# Patient Record
Sex: FEMALE | Race: WHITE | NOT HISPANIC OR LATINO | Employment: FULL TIME | URBAN - NONMETROPOLITAN AREA
[De-identification: names, ages, dates, MRNs, and addresses within clinical notes are randomized per-mention and may not be internally consistent; named-entity substitution may affect disease eponyms.]

---

## 2023-03-14 ENCOUNTER — OFFICE VISIT (OUTPATIENT)
Dept: PRIMARY CARE | Facility: CLINIC | Age: 58
End: 2023-03-14
Payer: COMMERCIAL

## 2023-03-14 VITALS
HEART RATE: 87 BPM | SYSTOLIC BLOOD PRESSURE: 138 MMHG | DIASTOLIC BLOOD PRESSURE: 87 MMHG | WEIGHT: 214 LBS | BODY MASS INDEX: 34.39 KG/M2 | HEIGHT: 66 IN

## 2023-03-14 DIAGNOSIS — R21 RASH DUE TO ALLERGY: Primary | ICD-10-CM

## 2023-03-14 DIAGNOSIS — T78.40XA RASH DUE TO ALLERGY: Primary | ICD-10-CM

## 2023-03-14 PROCEDURE — 3075F SYST BP GE 130 - 139MM HG: CPT | Performed by: INTERNAL MEDICINE

## 2023-03-14 PROCEDURE — 1036F TOBACCO NON-USER: CPT | Performed by: INTERNAL MEDICINE

## 2023-03-14 PROCEDURE — 3079F DIAST BP 80-89 MM HG: CPT | Performed by: INTERNAL MEDICINE

## 2023-03-14 PROCEDURE — 99213 OFFICE O/P EST LOW 20 MIN: CPT | Performed by: INTERNAL MEDICINE

## 2023-03-14 RX ORDER — LYSINE HCL 500 MG
1 TABLET ORAL DAILY
COMMUNITY

## 2023-03-14 RX ORDER — PREDNISONE 10 MG/1
10 TABLET ORAL 3 TIMES DAILY
Qty: 15 TABLET | Refills: 0 | Status: SHIPPED | OUTPATIENT
Start: 2023-03-14 | End: 2023-03-19

## 2023-03-14 RX ORDER — TIRZEPATIDE 10 MG/.5ML
10 INJECTION, SOLUTION SUBCUTANEOUS
COMMUNITY
End: 2023-11-20 | Stop reason: ALTCHOICE

## 2023-03-14 RX ORDER — LISINOPRIL 10 MG/1
10 TABLET ORAL DAILY
COMMUNITY
End: 2023-11-20 | Stop reason: SDUPTHER

## 2023-03-14 ASSESSMENT — ENCOUNTER SYMPTOMS
NECK STIFFNESS: 0
CONSTITUTIONAL NEGATIVE: 1
CONSTIPATION: 0
ABDOMINAL PAIN: 0
SHORTNESS OF BREATH: 0
ENDOCRINE NEGATIVE: 1
NAUSEA: 0
CONFUSION: 0
HEMATOLOGIC/LYMPHATIC NEGATIVE: 1
ABDOMINAL DISTENTION: 0
NEUROLOGICAL NEGATIVE: 1
PSYCHIATRIC NEGATIVE: 1
HEADACHES: 0
PALPITATIONS: 0
RESPIRATORY NEGATIVE: 1
BACK PAIN: 0
CHILLS: 0
DIARRHEA: 0
MUSCULOSKELETAL NEGATIVE: 1
GASTROINTESTINAL NEGATIVE: 1
NUMBNESS: 0
LIGHT-HEADEDNESS: 0
COUGH: 0
ADENOPATHY: 0
EYE DISCHARGE: 0
JOINT SWELLING: 0
DYSURIA: 0
EYES NEGATIVE: 1
FEVER: 0
AGITATION: 0
ALLERGIC/IMMUNOLOGIC NEGATIVE: 1
WHEEZING: 0
VOMITING: 0
CARDIOVASCULAR NEGATIVE: 1

## 2023-03-14 ASSESSMENT — PATIENT HEALTH QUESTIONNAIRE - PHQ9
SUM OF ALL RESPONSES TO PHQ9 QUESTIONS 1 AND 2: 0
2. FEELING DOWN, DEPRESSED OR HOPELESS: NOT AT ALL
1. LITTLE INTEREST OR PLEASURE IN DOING THINGS: NOT AT ALL

## 2023-03-14 NOTE — PROGRESS NOTES
Subjective   Patient ID: Maureen Giles is a 57 y.o. female who presents for Follow-up (PT CO RASH ON FACE AND NECK THAT IS VERY ITCHY STARTED SUNDAY).  PT C/O RASH ON FACE  FOR 3 DAYS  SEVERITY: MILD  CHARACTERISTIC: ACUTE  EXACERBATION FACTOR: NONE  RELIEVING FACTOR: NONE  ASSOCIATED SYMPTOMS: ITCH  PRIOR TX: BENADRYL ADVIL HYDROCIORTISONE            Review of Systems   Constitutional: Negative.  Negative for chills and fever.   HENT: Negative.  Negative for congestion.    Eyes: Negative.  Negative for discharge.   Respiratory: Negative.  Negative for cough, shortness of breath and wheezing.    Cardiovascular: Negative.  Negative for chest pain, palpitations and leg swelling.   Gastrointestinal: Negative.  Negative for abdominal distention, abdominal pain, constipation, diarrhea, nausea and vomiting.   Endocrine: Negative.    Genitourinary: Negative.  Negative for dysuria and urgency.   Musculoskeletal: Negative.  Negative for back pain, joint swelling and neck stiffness.   Skin:  Positive for rash.   Allergic/Immunologic: Negative.  Negative for immunocompromised state.   Neurological: Negative.  Negative for light-headedness, numbness and headaches.   Hematological: Negative.  Negative for adenopathy.   Psychiatric/Behavioral: Negative.  Negative for agitation, behavioral problems and confusion.    All other systems reviewed and are negative.      Objective   Physical Exam  Vitals reviewed.   Constitutional:       General: She is not in acute distress.     Appearance: Normal appearance.   HENT:      Head: Normocephalic and atraumatic.      Nose: Nose normal.   Eyes:      Conjunctiva/sclera: Conjunctivae normal.      Pupils: Pupils are equal, round, and reactive to light.   Neck:      Vascular: No carotid bruit.   Cardiovascular:      Rate and Rhythm: Normal rate and regular rhythm.      Pulses: Normal pulses.      Heart sounds:      No gallop.   Pulmonary:      Effort: Pulmonary effort is normal. No  "respiratory distress.      Breath sounds: Normal breath sounds. No wheezing.   Abdominal:      General: Bowel sounds are normal.      Palpations: Abdomen is soft.      Tenderness: There is no abdominal tenderness.   Musculoskeletal:         General: Normal range of motion.      Cervical back: Normal range of motion. No rigidity.   Lymphadenopathy:      Cervical: No cervical adenopathy.   Skin:     General: Skin is warm.      Findings: No rash.      Comments: ERYTHEMATOUS ITCH ON BOTH CHEEKS AND NOSE   Neurological:      General: No focal deficit present.      Mental Status: She is alert and oriented to person, place, and time.   Psychiatric:         Mood and Affect: Mood normal.         Behavior: Behavior normal.       /87 (BP Location: Left arm, Patient Position: Sitting)   Pulse 87   Ht 1.676 m (5' 6\")   Wt 97.1 kg (214 lb)   BMI 34.54 kg/m²    Hemoglobin A1C   Date/Time Value Ref Range Status   11/14/2022 08:06 AM 5.6 % Final     Comment:          Diagnosis of Diabetes-Adults   Non-Diabetic: < or = 5.6%   Increased risk for developing diabetes: 5.7-6.4%   Diagnostic of diabetes: > or = 6.5%  .       Monitoring of Diabetes                Age (y)     Therapeutic Goal (%)   Adults:          >18           <7.0   Pediatrics:    13-18           <7.5                   7-12           <8.0                   0- 6            7.5-8.5   American Diabetes Association. Diabetes Care 33(S1), Jan 2010.       Assessment/Plan   Problem List Items Addressed This Visit    None  Visit Diagnoses       Rash due to allergy    -  Primary    Relevant Medications    dexAMETHasone (Decadron) injection 4 mg (Start on 3/14/2023  4:00 PM)    predniSONE (Deltasone) 10 mg tablet           "

## 2023-03-17 PROBLEM — R73.02 GLUCOSE INTOLERANCE (IMPAIRED GLUCOSE TOLERANCE): Status: ACTIVE | Noted: 2023-03-17

## 2023-03-17 PROBLEM — M79.671 RIGHT FOOT PAIN: Status: ACTIVE | Noted: 2023-03-17

## 2023-03-17 PROBLEM — M47.816 LUMBAR ARTHROPATHY: Status: ACTIVE | Noted: 2023-03-17

## 2023-03-17 PROBLEM — G89.29 CHRONIC LOW BACK PAIN WITH RIGHT-SIDED SCIATICA: Status: ACTIVE | Noted: 2023-03-17

## 2023-03-17 PROBLEM — S53.402A SPRAIN OF LEFT UPPER ARM: Status: ACTIVE | Noted: 2023-03-17

## 2023-03-17 PROBLEM — M46.1 SACROILIITIS (CMS-HCC): Status: ACTIVE | Noted: 2023-03-17

## 2023-03-17 PROBLEM — J30.9 ALLERGIC RHINITIS: Status: ACTIVE | Noted: 2023-03-17

## 2023-03-17 PROBLEM — E78.00 HYPERCHOLESTEREMIA: Status: ACTIVE | Noted: 2023-03-17

## 2023-03-17 PROBLEM — M51.379 DISC DISEASE, DEGENERATIVE, LUMBAR OR LUMBOSACRAL: Status: ACTIVE | Noted: 2023-03-17

## 2023-03-17 PROBLEM — M54.41 CHRONIC LOW BACK PAIN WITH RIGHT-SIDED SCIATICA: Status: ACTIVE | Noted: 2023-03-17

## 2023-03-17 PROBLEM — K21.9 GERD (GASTROESOPHAGEAL REFLUX DISEASE): Status: ACTIVE | Noted: 2023-03-17

## 2023-03-17 PROBLEM — E66.01 CLASS 2 SEVERE OBESITY DUE TO EXCESS CALORIES WITH SERIOUS COMORBIDITY AND BODY MASS INDEX (BMI) OF 35.0 TO 35.9 IN ADULT (MULTI): Status: ACTIVE | Noted: 2023-03-17

## 2023-03-17 PROBLEM — E66.812 CLASS 2 SEVERE OBESITY DUE TO EXCESS CALORIES WITH SERIOUS COMORBIDITY AND BODY MASS INDEX (BMI) OF 35.0 TO 35.9 IN ADULT: Status: ACTIVE | Noted: 2023-03-17

## 2023-03-17 PROBLEM — R63.5 WEIGHT GAIN: Status: ACTIVE | Noted: 2023-03-17

## 2023-03-17 PROBLEM — G47.00 INSOMNIA: Status: ACTIVE | Noted: 2023-03-17

## 2023-03-17 PROBLEM — M51.37 DISC DISEASE, DEGENERATIVE, LUMBAR OR LUMBOSACRAL: Status: ACTIVE | Noted: 2023-03-17

## 2023-03-17 PROBLEM — M54.16 LUMBAR NEURITIS: Status: ACTIVE | Noted: 2023-03-17

## 2023-03-17 PROBLEM — I10 BENIGN ESSENTIAL HYPERTENSION: Status: ACTIVE | Noted: 2023-03-17

## 2023-03-17 PROBLEM — G56.03 BILATERAL CARPAL TUNNEL SYNDROME: Status: ACTIVE | Noted: 2023-03-17

## 2023-03-17 RX ORDER — MELOXICAM 15 MG/1
15 TABLET ORAL DAILY
COMMUNITY
End: 2023-11-20 | Stop reason: ALTCHOICE

## 2023-03-21 ENCOUNTER — APPOINTMENT (OUTPATIENT)
Dept: PRIMARY CARE | Facility: CLINIC | Age: 58
End: 2023-03-21
Payer: COMMERCIAL

## 2023-11-10 ENCOUNTER — LAB (OUTPATIENT)
Dept: LAB | Facility: LAB | Age: 58
End: 2023-11-10
Payer: COMMERCIAL

## 2023-11-10 DIAGNOSIS — Z00.00 ENCOUNTER FOR GENERAL ADULT MEDICAL EXAMINATION WITHOUT ABNORMAL FINDINGS: Primary | ICD-10-CM

## 2023-11-10 DIAGNOSIS — R73.02 IMPAIRED GLUCOSE TOLERANCE (ORAL): ICD-10-CM

## 2023-11-10 LAB
ALBUMIN SERPL BCP-MCNC: 4 G/DL (ref 3.4–5)
ALP SERPL-CCNC: 79 U/L (ref 33–110)
ALT SERPL W P-5'-P-CCNC: 21 U/L (ref 7–45)
ANION GAP SERPL CALC-SCNC: 10 MMOL/L (ref 10–20)
AST SERPL W P-5'-P-CCNC: 16 U/L (ref 9–39)
BASOPHILS # BLD AUTO: 0.05 X10*3/UL (ref 0–0.1)
BASOPHILS NFR BLD AUTO: 0.8 %
BILIRUB SERPL-MCNC: 0.4 MG/DL (ref 0–1.2)
BUN SERPL-MCNC: 16 MG/DL (ref 6–23)
CALCIUM SERPL-MCNC: 9.2 MG/DL (ref 8.6–10.3)
CHLORIDE SERPL-SCNC: 106 MMOL/L (ref 98–107)
CHOLEST SERPL-MCNC: 183 MG/DL (ref 0–199)
CHOLESTEROL/HDL RATIO: 3.7
CO2 SERPL-SCNC: 29 MMOL/L (ref 21–32)
CREAT SERPL-MCNC: 0.67 MG/DL (ref 0.5–1.05)
EOSINOPHIL # BLD AUTO: 0.13 X10*3/UL (ref 0–0.7)
EOSINOPHIL NFR BLD AUTO: 2 %
ERYTHROCYTE [DISTWIDTH] IN BLOOD BY AUTOMATED COUNT: 13 % (ref 11.5–14.5)
EST. AVERAGE GLUCOSE BLD GHB EST-MCNC: 123 MG/DL
GFR SERPL CREATININE-BSD FRML MDRD: >90 ML/MIN/1.73M*2
GLUCOSE SERPL-MCNC: 95 MG/DL (ref 74–99)
HBA1C MFR BLD: 5.9 %
HCT VFR BLD AUTO: 42.9 % (ref 36–46)
HDLC SERPL-MCNC: 50 MG/DL
HGB BLD-MCNC: 14.1 G/DL (ref 12–16)
IMM GRANULOCYTES # BLD AUTO: 0.04 X10*3/UL (ref 0–0.7)
IMM GRANULOCYTES NFR BLD AUTO: 0.6 % (ref 0–0.9)
LDLC SERPL CALC-MCNC: 111 MG/DL
LYMPHOCYTES # BLD AUTO: 1.48 X10*3/UL (ref 1.2–4.8)
LYMPHOCYTES NFR BLD AUTO: 22.6 %
MCH RBC QN AUTO: 29.1 PG (ref 26–34)
MCHC RBC AUTO-ENTMCNC: 32.9 G/DL (ref 32–36)
MCV RBC AUTO: 89 FL (ref 80–100)
MONOCYTES # BLD AUTO: 0.53 X10*3/UL (ref 0.1–1)
MONOCYTES NFR BLD AUTO: 8.1 %
NEUTROPHILS # BLD AUTO: 4.33 X10*3/UL (ref 1.2–7.7)
NEUTROPHILS NFR BLD AUTO: 65.9 %
NON HDL CHOLESTEROL: 133 MG/DL (ref 0–149)
NRBC BLD-RTO: 0 /100 WBCS (ref 0–0)
PLATELET # BLD AUTO: 269 X10*3/UL (ref 150–450)
POTASSIUM SERPL-SCNC: 4.8 MMOL/L (ref 3.5–5.3)
PROT SERPL-MCNC: 6.5 G/DL (ref 6.4–8.2)
RBC # BLD AUTO: 4.85 X10*6/UL (ref 4–5.2)
SODIUM SERPL-SCNC: 140 MMOL/L (ref 136–145)
TRIGL SERPL-MCNC: 108 MG/DL (ref 0–149)
TSH SERPL-ACNC: 2.15 MIU/L (ref 0.44–3.98)
VLDL: 22 MG/DL (ref 0–40)
WBC # BLD AUTO: 6.6 X10*3/UL (ref 4.4–11.3)

## 2023-11-10 PROCEDURE — 80061 LIPID PANEL: CPT

## 2023-11-10 PROCEDURE — 36415 COLL VENOUS BLD VENIPUNCTURE: CPT

## 2023-11-10 PROCEDURE — 80053 COMPREHEN METABOLIC PANEL: CPT

## 2023-11-10 PROCEDURE — 84443 ASSAY THYROID STIM HORMONE: CPT

## 2023-11-10 PROCEDURE — 83036 HEMOGLOBIN GLYCOSYLATED A1C: CPT

## 2023-11-10 PROCEDURE — 85025 COMPLETE CBC W/AUTO DIFF WBC: CPT

## 2023-11-20 ENCOUNTER — OFFICE VISIT (OUTPATIENT)
Dept: PRIMARY CARE | Facility: CLINIC | Age: 58
End: 2023-11-20
Payer: COMMERCIAL

## 2023-11-20 VITALS
BODY MASS INDEX: 33.91 KG/M2 | HEIGHT: 66 IN | DIASTOLIC BLOOD PRESSURE: 82 MMHG | WEIGHT: 211 LBS | SYSTOLIC BLOOD PRESSURE: 123 MMHG | HEART RATE: 83 BPM

## 2023-11-20 DIAGNOSIS — Z78.0 POST-MENOPAUSAL: ICD-10-CM

## 2023-11-20 DIAGNOSIS — M79.644 PAIN IN FINGER OF BOTH HANDS: ICD-10-CM

## 2023-11-20 DIAGNOSIS — Z12.31 ENCOUNTER FOR SCREENING MAMMOGRAM FOR BREAST CANCER: ICD-10-CM

## 2023-11-20 DIAGNOSIS — M79.671 RIGHT FOOT PAIN: ICD-10-CM

## 2023-11-20 DIAGNOSIS — I10 BENIGN ESSENTIAL HYPERTENSION: Primary | ICD-10-CM

## 2023-11-20 DIAGNOSIS — E66.09 CLASS 1 OBESITY DUE TO EXCESS CALORIES WITH SERIOUS COMORBIDITY AND BODY MASS INDEX (BMI) OF 34.0 TO 34.9 IN ADULT: ICD-10-CM

## 2023-11-20 DIAGNOSIS — M79.645 PAIN IN FINGER OF BOTH HANDS: ICD-10-CM

## 2023-11-20 DIAGNOSIS — R73.02 GLUCOSE INTOLERANCE (IMPAIRED GLUCOSE TOLERANCE): ICD-10-CM

## 2023-11-20 DIAGNOSIS — M79.622 AXILLARY PAIN, LEFT: ICD-10-CM

## 2023-11-20 PROBLEM — E66.811 CLASS 1 OBESITY DUE TO EXCESS CALORIES WITH SERIOUS COMORBIDITY AND BODY MASS INDEX (BMI) OF 34.0 TO 34.9 IN ADULT: Status: ACTIVE | Noted: 2023-03-17

## 2023-11-20 PROCEDURE — 99214 OFFICE O/P EST MOD 30 MIN: CPT | Performed by: PHYSICIAN ASSISTANT

## 2023-11-20 PROCEDURE — 1036F TOBACCO NON-USER: CPT | Performed by: PHYSICIAN ASSISTANT

## 2023-11-20 PROCEDURE — 3008F BODY MASS INDEX DOCD: CPT | Performed by: PHYSICIAN ASSISTANT

## 2023-11-20 PROCEDURE — 3074F SYST BP LT 130 MM HG: CPT | Performed by: PHYSICIAN ASSISTANT

## 2023-11-20 PROCEDURE — 3079F DIAST BP 80-89 MM HG: CPT | Performed by: PHYSICIAN ASSISTANT

## 2023-11-20 RX ORDER — LISINOPRIL 10 MG/1
10 TABLET ORAL DAILY
Qty: 90 TABLET | Refills: 3 | Status: SHIPPED | OUTPATIENT
Start: 2023-11-20

## 2023-11-20 RX ORDER — MULTIVITAMIN
1 TABLET ORAL
COMMUNITY

## 2023-11-20 ASSESSMENT — ENCOUNTER SYMPTOMS
FEVER: 0
EYE DISCHARGE: 0
RHINORRHEA: 0
ARTHRALGIAS: 1
SHORTNESS OF BREATH: 0
NUMBNESS: 0
EYE REDNESS: 0
COUGH: 0
VOMITING: 0
CHEST TIGHTNESS: 0
FREQUENCY: 0
ABDOMINAL PAIN: 0
MYALGIAS: 1
WHEEZING: 0
HEADACHES: 0
CHILLS: 0
FATIGUE: 1
BACK PAIN: 0
TREMORS: 0
BRUISES/BLEEDS EASILY: 0
CONSTIPATION: 0
WOUND: 0
DIZZINESS: 0
NECK PAIN: 0
SINUS PAIN: 0
FLANK PAIN: 0
PALPITATIONS: 0
DIARRHEA: 0
SORE THROAT: 0
NAUSEA: 0
CONFUSION: 0
SLEEP DISTURBANCE: 0

## 2023-11-20 NOTE — PROGRESS NOTES
Subjective   Patient ID: Maureen Giles is a 58 y.o. female who presents for Follow-up    HPI  Wellness - too early so will reschedule for later this week      Labs     med check   HTN - lisinopril   Pain - flexeril and mobic prn   wt loss - she is following with a clinic and is on mounjouro has since been off med but done well maintaining wt loss but questions if she can re-start.  We discussed wegovey or saxenda or she can reach out to the clinic again   foot pain - plantar fascitis/heel spur - cont to suffer at times. using NSAID only prn and denies following with pod in a while- she notes occasional cramp /spasm into the L calf.  We discussed consider related to dehydration vs the foot issues - will monitor     L axilla fullness and discomfort - some limitation in movement  Denies any known injury   Possibly started around the same time as her husbands cardiac work up and he is now wearing angel hose - she admits it is a struggle for her put on the angel hose  She denies feeling a lympnode  or breast mass   She is due for mammo so this will be done   She does notes some finger pain as well that is flared up since putting on the angel hose - comes and goes - no known injury     Preventative testing   mammo - Dec 2021- WNl- agree to do every other year   DEXa- Dec 2019 - WNL  pap - 2014 -- pt declines at this time  COLONOSCOPY - Cologuard dec 2021- NEG   Fall -NEG nov 2023   PHQ2 NEG NOV 2023     - Other Providers  Eye - follows annually - [facility]  Dentist - every 6 mo   Pod - Dr Martin - has been quite sometime     - Vaccines   Flu - declines   PNA - suggest age 65  COVID - Moderna Jan and FEb 2021 and Booster in Dec 2021   Shingles- suggest age 50   Dtap - over 5 years   MMR -   Patient Active Problem List   Diagnosis    Allergic rhinitis    Benign essential hypertension    Bilateral carpal tunnel syndrome    Chronic low back pain with right-sided sciatica    GERD (gastroesophageal reflux disease)    Glucose  intolerance (impaired glucose tolerance)    Hypercholesteremia    Insomnia    Lumbar arthropathy    Lumbar neuritis    Disc disease, degenerative, lumbar or lumbosacral    Right foot pain    Sacroiliitis (CMS/HCC)    Sprain of left upper arm    Weight gain    Class 2 severe obesity due to excess calories with serious comorbidity and body mass index (BMI) of 35.0 to 35.9 in adult (CMS/[facility])       Review of Systems   Constitutional:  Positive for fatigue. Negative for chills and fever.   HENT:  Negative for congestion, rhinorrhea, sinus pain, sore throat and tinnitus.    Eyes:  Negative for discharge, redness and visual disturbance.   Respiratory:  Negative for cough, chest tightness, shortness of breath and wheezing.    Cardiovascular:  Negative for chest pain, palpitations and leg swelling.   Gastrointestinal:  Negative for abdominal pain, constipation, diarrhea, nausea and vomiting.   Endocrine: Negative for cold intolerance and heat intolerance.   Genitourinary:  Negative for flank pain, frequency and urgency.   Musculoskeletal:  Positive for arthralgias and myalgias. Negative for back pain, gait problem and neck pain.   Skin:  Negative for rash and wound.   Neurological:  Negative for dizziness, tremors, syncope, numbness and headaches.   Hematological:  Does not bruise/bleed easily.   Psychiatric/Behavioral:  Negative for confusion, sleep disturbance and suicidal ideas.        Past Medical History:   Diagnosis Date    Encounter for gynecological examination (general) (routine) without abnormal findings     Pap test, as part of routine gynecological examination    Encounter for screening for malignant neoplasm of colon 12/08/2021    COLOGUARD NEG    Immunization not carried out because of patient refusal     Influenza vaccination declined    Personal history of other diseases of the musculoskeletal system and connective tissue     History of low back pain    Personal history of other diseases of the  "musculoskeletal system and connective tissue     History of muscle spasm    Personal history of other medical treatment     H/O bone density study    Personal history of other medical treatment     H/O mammogram    Personal history of other specified conditions     History of palpitations       Past Surgical History:   Procedure Laterality Date     SECTION, LOW TRANSVERSE      OTHER SURGICAL HISTORY  2021    Epidural steroid injection    OTHER SURGICAL HISTORY  2021    Facet block       Family History   Problem Relation Name Age of Onset    Heart disease Mother      Arthritis Mother      Diabetes type II Mother      Hearing loss Father      Heart disease Father      Other (heart problem) Father      Diabetes type II Sister      Parkinsonism Brother         Social History     Tobacco Use    Smoking status: Never    Smokeless tobacco: Never   Vaping Use    Vaping Use: Never used   Substance Use Topics    Alcohol use: Yes     Comment: RARE OCCASION    Drug use: Never       No Known Allergies    Current Outpatient Medications   Medication Sig Dispense Refill    calcium carbonate-vit D3-min 600 mg calcium- 400 unit tablet Take 1 tablet by mouth once daily.      lisinopril 10 mg tablet Take 1 tablet (10 mg) by mouth once daily.      multivitamin tablet Take 1 tablet by mouth once daily.       No current facility-administered medications for this visit.       Objective   /82   Pulse 83   Ht 1.676 m (5' 6\")   Wt 95.7 kg (211 lb)   BMI 34.06 kg/m²     Physical Exam  Vitals reviewed.   Constitutional:       Appearance: Normal appearance. She is obese.   HENT:      Head: Normocephalic.      Right Ear: External ear normal.      Left Ear: External ear normal.      Nose: Nose normal. No congestion or rhinorrhea.      Mouth/Throat:      Mouth: Mucous membranes are moist.   Eyes:      Extraocular Movements: Extraocular movements intact.      Conjunctiva/sclera: Conjunctivae normal.      Pupils: Pupils " are equal, round, and reactive to light.   Cardiovascular:      Rate and Rhythm: Normal rate and regular rhythm.      Pulses: Normal pulses.   Pulmonary:      Effort: Pulmonary effort is normal.      Breath sounds: Normal breath sounds.   Abdominal:      General: Bowel sounds are normal.      Palpations: Abdomen is soft.      Tenderness: There is no abdominal tenderness. There is no right CVA tenderness or left CVA tenderness.   Musculoskeletal:         General: Tenderness and deformity present. Normal range of motion.      Cervical back: Normal range of motion and neck supple. No tenderness.   Skin:     General: Skin is warm and dry.      Comments: L axillary region - no palp lymphnodes   Given her presentation I suspect this is more of muscle strain from the activity of putting on her husbands angel hose    Neurological:      General: No focal deficit present.      Mental Status: She is alert and oriented to person, place, and time.   Psychiatric:         Mood and Affect: Mood normal.         Behavior: Behavior normal.       Testing   Component      Latest Ref [facility] 11/10/2023   WBC      4.4 - 11.3 x10*3/uL 6.6    nRBC      0.0 - 0.0 /100 WBCs 0.0    RBC      4.00 - 5.20 x10*6/uL 4.85    HEMOGLOBIN      12.0 - 16.0 g/dL 14.1    HEMATOCRIT      36.0 - 46.0 % 42.9    MCV      80 - 100 fL 89    MCH      26.0 - 34.0 pg 29.1    MCHC      32.0 - 36.0 g/dL 32.9    RED CELL DISTRIBUTION WIDTH      11.5 - 14.5 % 13.0    Platelets      150 - 450 x10*3/uL 269    Neutrophils %      40.0 - 80.0 % 65.9    Immature Granulocytes %, Automated      0.0 - 0.9 % 0.6    Lymphocytes %      13.0 - 44.0 % 22.6    Monocytes %      2.0 - 10.0 % 8.1    Eosinophils %      0.0 - 6.0 % 2.0    Basophils %      0.0 - 2.0 % 0.8    Neutrophils Absolute      1.20 - 7.70 x10*3/uL 4.33    Immature Granulocytes Absolute, Automated      0.00 - 0.70 x10*3/uL 0.04    Lymphocytes Absolute      1.20 - 4.80 x10*3/uL 1.48    Monocytes Absolute      0.10 - 1.00  x10*3/uL 0.53    Eosinophils Absolute      0.00 - 0.70 x10*3/uL 0.13    Basophils Absolute      0.00 - 0.10 x10*3/uL 0.05    GLUCOSE      74 - 99 mg/dL 95    SODIUM      136 - 145 mmol/L 140    POTASSIUM      3.5 - 5.3 mmol/L 4.8    CHLORIDE      98 - 107 mmol/L 106    Bicarbonate      21 - 32 mmol/L 29    Anion Gap      10 - 20 mmol/L 10    Blood Urea Nitrogen      6 - 23 mg/dL 16    Creatinine      0.50 - 1.05 mg/dL 0.67    EGFR      >60 mL/min/1.73m*2 >90    Calcium      8.6 - 10.3 mg/dL 9.2    Albumin      3.4 - 5.0 g/dL 4.0    Alkaline Phosphatase      33 - 110 U/L 79    Total Protein      6.4 - 8.2 g/dL 6.5    AST      9 - 39 U/L 16    Bilirubin Total      0.0 - 1.2 mg/dL 0.4    ALT      7 - 45 U/L 21    CHOLESTEROL      0 - 199 mg/dL 183    HDL CHOLESTEROL      mg/dL 50.0    Cholesterol/HDL Ratio 3.7    LDL Calculated      <=99 mg/dL 111 (H)    VLDL      0 - 40 mg/dL 22    TRIGLYCERIDES      0 - 149 mg/dL 108    Non HDL Cholesterol      0 - 149 mg/dL 133    Hemoglobin A1C      see below % 5.9 (H)    Estimated Average Glucose      Not Established mg/dL 123    Thyroid Stimulating Hormone      0.44 - 3.98 mIU/L 2.15       Impression    MDM    1) COMPLEXITY: MORE THAN 1 STABLE CHRONIC CONDITION ADDRESSED  2)DATA: TESTS INTERPRETED AND OR ORDERED, TOOK INDEPENDENT HISTORY OR RECORDS REVIEWED  3)RISK: MODERATE RISK DUE TO NATURE OF MEDICAL CONDITIONS/COMORBIDITY OR MEDICATIONS ORDERED OR SURGICAL OR PROCEDURE REFERRAL, .       Reviewed labs and Testing on file   Patient to follow diet low in cholesterol, fat, and sodium.    Patient is advised to increase Exercise.  Patient is recommended to lose weight.  Reviewed Meds and discussed common side effects  Continue as directed   Axillary discomfort- suspect more of strain with her change in ADLS - will monitor and get updated mammo   Calf spasm - discussed dehydration vs triggered from the pod issues.  Cont to follow with pod - inc water intake - will check mag next  set of labs   Hand /finger pain - suspect OA - will monitor   Will plan to order labs for 1 year from now at her wellness visit later this week   Patient is strongly advised to be compliant with recommendations.    Return to Clinic sooner if needed.  Patient denies further questions/concerns at this time     Assessment/Plan   Problem List Items Addressed This Visit             ICD-10-CM    Benign essential hypertension - Primary I10    Relevant Medications    lisinopril 10 mg tablet    Glucose intolerance (impaired glucose tolerance) R73.02    Right foot pain M79.671    Class 1 obesity due to excess calories with serious comorbidity and body mass index (BMI) of 34.0 to 34.9 in adult E66.09, Z68.34     Other Visit Diagnoses         Codes    Post-menopausal     Z78.0    Relevant Orders    XR DEXA bone density    Encounter for screening mammogram for breast cancer     Z12.31    Relevant Orders    BI mammo bilateral screening tomosynthesis    Axillary pain, left     M79.622    Pain in finger of both hands     M79.645, M79.644             FU wed double book 8 AM - wellness with labs

## 2023-11-22 ENCOUNTER — OFFICE VISIT (OUTPATIENT)
Dept: PRIMARY CARE | Facility: CLINIC | Age: 58
End: 2023-11-22
Payer: COMMERCIAL

## 2023-11-22 VITALS
BODY MASS INDEX: 34.07 KG/M2 | HEIGHT: 66 IN | OXYGEN SATURATION: 92 % | DIASTOLIC BLOOD PRESSURE: 83 MMHG | HEART RATE: 74 BPM | WEIGHT: 212 LBS | SYSTOLIC BLOOD PRESSURE: 121 MMHG

## 2023-11-22 DIAGNOSIS — E66.09 CLASS 1 OBESITY DUE TO EXCESS CALORIES WITH SERIOUS COMORBIDITY AND BODY MASS INDEX (BMI) OF 34.0 TO 34.9 IN ADULT: ICD-10-CM

## 2023-11-22 DIAGNOSIS — I10 BENIGN ESSENTIAL HYPERTENSION: ICD-10-CM

## 2023-11-22 DIAGNOSIS — R73.02 GLUCOSE INTOLERANCE (IMPAIRED GLUCOSE TOLERANCE): ICD-10-CM

## 2023-11-22 DIAGNOSIS — Z00.00 ENCOUNTER FOR WELLNESS EXAMINATION IN ADULT: Primary | ICD-10-CM

## 2023-11-22 DIAGNOSIS — E78.00 HYPERCHOLESTEREMIA: ICD-10-CM

## 2023-11-22 PROCEDURE — 3074F SYST BP LT 130 MM HG: CPT | Performed by: PHYSICIAN ASSISTANT

## 2023-11-22 PROCEDURE — 3079F DIAST BP 80-89 MM HG: CPT | Performed by: PHYSICIAN ASSISTANT

## 2023-11-22 PROCEDURE — 99396 PREV VISIT EST AGE 40-64: CPT | Performed by: PHYSICIAN ASSISTANT

## 2023-11-22 PROCEDURE — 1036F TOBACCO NON-USER: CPT | Performed by: PHYSICIAN ASSISTANT

## 2023-11-22 PROCEDURE — 3008F BODY MASS INDEX DOCD: CPT | Performed by: PHYSICIAN ASSISTANT

## 2023-11-22 ASSESSMENT — ENCOUNTER SYMPTOMS
NUMBNESS: 0
FATIGUE: 0
COUGH: 0
SORE THROAT: 0
FREQUENCY: 0
PALPITATIONS: 0
DIZZINESS: 0
SINUS PAIN: 0
EYE REDNESS: 0
SLEEP DISTURBANCE: 0
HEADACHES: 0
EYE DISCHARGE: 0
NECK PAIN: 0
BACK PAIN: 0
TREMORS: 0
SHORTNESS OF BREATH: 0
DIARRHEA: 0
VOMITING: 0
NAUSEA: 0
WHEEZING: 0
BRUISES/BLEEDS EASILY: 0
FLANK PAIN: 0
FEVER: 0
CHILLS: 0
CONSTIPATION: 0
CONFUSION: 0
CHEST TIGHTNESS: 0
RHINORRHEA: 0
WOUND: 0
ABDOMINAL PAIN: 0

## 2023-11-22 NOTE — PROGRESS NOTES
Subjective   Patient ID: Maureen Giles is a 58 y.o. female who presents for WELLNESS VISIT (WELLNESS VISIT)    HPI  Wellness       Labs      med check   HTN - lisinopril   Pain - flexeril and mobic prn   wt loss - she is following with a clinic and is on mounjouro has since been off med but done well maintaining wt loss but questions if she can re-start.  We discussed wegovey or saxenda - not covered or she can reach out to the clinic again   foot pain - plantar fascitis/heel spur - cont to suffer at times. using NSAID only prn and denies following with pod in a while- she notes occasional cramp /spasm into the L calf.  We discussed consider related to dehydration vs the foot issues - will monitor      Preventative testing   mammo - Dec 2021- WNl-scheduled for next week   DEXa- Dec 2019 - WNL- scheduled next week   pap - 2014 -- pt declines at this time  COLONOSCOPY - Cologuard dec 2021- NEG   Fall -NEG nov 2023   PHQ2 NEG NOV 2023      - Other Providers  Eye - follows annually - [facility]  Dentist - every 6 mo   Pod - Dr Martin - fall 2023      - Vaccines   Flu - declines   PNA - suggest age 65  COVID - Moderna Jan and FEb 2021 and Booster in Dec 2021   Shingles- suggest age 50 - suggest shingrix   Dtap - over 6 years - suggest booster  MMR -       Patient Active Problem List   Diagnosis    Allergic rhinitis    Benign essential hypertension    Bilateral carpal tunnel syndrome    Chronic low back pain with right-sided sciatica    GERD (gastroesophageal reflux disease)    Glucose intolerance (impaired glucose tolerance)    Hypercholesteremia    Insomnia    Lumbar arthropathy    Lumbar neuritis    Disc disease, degenerative, lumbar or lumbosacral    Right foot pain    Sacroiliitis (CMS/HCC)    Sprain of left upper arm    Weight gain    Class 1 obesity due to excess calories with serious comorbidity and body mass index (BMI) of 34.0 to 34.9 in adult       Review of Systems   Constitutional:  Negative for  chills, fatigue and fever.   HENT:  Negative for congestion, rhinorrhea, sinus pain, sore throat and tinnitus.    Eyes:  Negative for discharge, redness and visual disturbance.   Respiratory:  Negative for cough, chest tightness, shortness of breath and wheezing.    Cardiovascular:  Negative for chest pain, palpitations and leg swelling.   Gastrointestinal:  Negative for abdominal pain, constipation, diarrhea, nausea and vomiting.   Endocrine: Negative for cold intolerance and heat intolerance.   Genitourinary:  Negative for flank pain, frequency and urgency.   Musculoskeletal:  Negative for back pain, gait problem and neck pain.   Skin:  Negative for rash and wound.   Neurological:  Negative for dizziness, tremors, syncope, numbness and headaches.   Hematological:  Does not bruise/bleed easily.   Psychiatric/Behavioral:  Negative for confusion, sleep disturbance and suicidal ideas.        Past Medical History:   Diagnosis Date    Encounter for gynecological examination (general) (routine) without abnormal findings     Pap test, as part of routine gynecological examination    Encounter for screening for malignant neoplasm of colon 2021    COLOGUARD NEG    Immunization not carried out because of patient refusal     Influenza vaccination declined    Personal history of other diseases of the musculoskeletal system and connective tissue     History of low back pain    Personal history of other diseases of the musculoskeletal system and connective tissue     History of muscle spasm    Personal history of other medical treatment     H/O bone density study    Personal history of other medical treatment     H/O mammogram    Personal history of other specified conditions     History of palpitations       Past Surgical History:   Procedure Laterality Date     SECTION, LOW TRANSVERSE      OTHER SURGICAL HISTORY  2021    Epidural steroid injection    OTHER SURGICAL HISTORY  2021    Facet block  "      Family History   Problem Relation Name Age of Onset    Heart disease Mother      Arthritis Mother      Diabetes type II Mother      Hearing loss Father      Heart disease Father      Other (heart problem) Father      Diabetes type II Sister      Parkinsonism Brother         Social History     Tobacco Use    Smoking status: Never    Smokeless tobacco: Never   Vaping Use    Vaping Use: Never used   Substance Use Topics    Alcohol use: Yes     Comment: RARE OCCASION    Drug use: Never       No Known Allergies    Current Outpatient Medications   Medication Sig Dispense Refill    calcium carbonate-vit D3-min 600 mg calcium- 400 unit tablet Take 1 tablet by mouth once daily.      lisinopril 10 mg tablet Take 1 tablet (10 mg) by mouth once daily. 90 tablet 3    multivitamin tablet Take 1 tablet by mouth once daily.       No current facility-administered medications for this visit.       Objective   /83   Pulse 74   Ht 1.676 m (5' 6\")   Wt 96.2 kg (212 lb)   SpO2 92%   BMI 34.22 kg/m²     Physical Exam  Vitals reviewed.   Constitutional:       Appearance: Normal appearance. She is obese.   HENT:      Head: Normocephalic.      Right Ear: External ear normal.      Left Ear: External ear normal.      Nose: Nose normal. No congestion or rhinorrhea.      Mouth/Throat:      Mouth: Mucous membranes are moist.   Eyes:      Extraocular Movements: Extraocular movements intact.      Conjunctiva/sclera: Conjunctivae normal.      Pupils: Pupils are equal, round, and reactive to light.   Cardiovascular:      Rate and Rhythm: Normal rate and regular rhythm.      Pulses: Normal pulses.   Pulmonary:      Effort: Pulmonary effort is normal.      Breath sounds: Normal breath sounds.   Abdominal:      General: Bowel sounds are normal.      Palpations: Abdomen is soft.      Tenderness: There is no abdominal tenderness. There is no right CVA tenderness or left CVA tenderness.   Musculoskeletal:         General: No tenderness. " Normal range of motion.      Cervical back: Normal range of motion and neck supple. No tenderness.   Skin:     General: Skin is warm and dry.   Neurological:      General: No focal deficit present.      Mental Status: She is alert and oriented to person, place, and time.   Psychiatric:         Mood and Affect: Mood normal.         Behavior: Behavior normal.       Testing     Component      Latest Ref Rng 11/10/2023   WBC      4.4 - 11.3 x10*3/uL 6.6    nRBC      0.0 - 0.0 /100 WBCs 0.0    RBC      4.00 - 5.20 x10*6/uL 4.85    HEMOGLOBIN      12.0 - 16.0 g/dL 14.1    HEMATOCRIT      36.0 - 46.0 % 42.9    MCV      80 - 100 fL 89    MCH      26.0 - 34.0 pg 29.1    MCHC      32.0 - 36.0 g/dL 32.9    RED CELL DISTRIBUTION WIDTH      11.5 - 14.5 % 13.0    Platelets      150 - 450 x10*3/uL 269    Neutrophils %      40.0 - 80.0 % 65.9    Immature Granulocytes %, Automated      0.0 - 0.9 % 0.6    Lymphocytes %      13.0 - 44.0 % 22.6    Monocytes %      2.0 - 10.0 % 8.1    Eosinophils %      0.0 - 6.0 % 2.0    Basophils %      0.0 - 2.0 % 0.8    Neutrophils Absolute      1.20 - 7.70 x10*3/uL 4.33    Immature Granulocytes Absolute, Automated      0.00 - 0.70 x10*3/uL 0.04    Lymphocytes Absolute      1.20 - 4.80 x10*3/uL 1.48    Monocytes Absolute      0.10 - 1.00 x10*3/uL 0.53    Eosinophils Absolute      0.00 - 0.70 x10*3/uL 0.13    Basophils Absolute      0.00 - 0.10 x10*3/uL 0.05    GLUCOSE      74 - 99 mg/dL 95    SODIUM      136 - 145 mmol/L 140    POTASSIUM      3.5 - 5.3 mmol/L 4.8    CHLORIDE      98 - 107 mmol/L 106    Bicarbonate      21 - 32 mmol/L 29    Anion Gap      10 - 20 mmol/L 10    Blood Urea Nitrogen      6 - 23 mg/dL 16    Creatinine      0.50 - 1.05 mg/dL 0.67    EGFR      >60 mL/min/1.73m*2 >90    Calcium      8.6 - 10.3 mg/dL 9.2    Albumin      3.4 - 5.0 g/dL 4.0    Alkaline Phosphatase      33 - 110 U/L 79    Total Protein      6.4 - 8.2 g/dL 6.5    AST      9 - 39 U/L 16    Bilirubin Total      0.0  - 1.2 mg/dL 0.4    ALT      7 - 45 U/L 21    CHOLESTEROL      0 - 199 mg/dL 183    HDL CHOLESTEROL      mg/dL 50.0    Cholesterol/HDL Ratio 3.7    LDL Calculated      <=99 mg/dL 111 (H)    VLDL      0 - 40 mg/dL 22    TRIGLYCERIDES      0 - 149 mg/dL 108    Non HDL Cholesterol      0 - 149 mg/dL 133    Hemoglobin A1C      see below % 5.9 (H)    Estimated Average Glucose      Not Established mg/dL 123    Thyroid Stimulating Hormone      0.44 - 3.98 mIU/L 2.15         Impression    MDM    1) COMPLEXITY: MORE THAN 1 STABLE CHRONIC CONDITION ADDRESSED  2)DATA: TESTS INTERPRETED AND OR ORDERED, TOOK INDEPENDENT HISTORY OR RECORDS REVIEWED  3)RISK: MODERATE RISK DUE TO NATURE OF MEDICAL CONDITIONS/COMORBIDITY OR MEDICATIONS ORDERED OR SURGICAL OR PROCEDURE REFERRAL, .       Reviewed labs and Testing on file   Patient to follow diet low in cholesterol, fat, and sodium.    Patient is advised to increase Exercise.  Patient is recommended to lose weight.  Reviewed Meds and discussed common side effects  Continue as directed   Patient is strongly advised to be compliant with recommendations.    Return to Clinic sooner if needed.  Patient denies further questions/concerns at this time     - discussed wellness - if her insurance will cover and does not need to be 12 mo +1 day to move sooner in the year - 6 mo.  She is to call so we can modify lab orders     Assessment/Plan   Problem List Items Addressed This Visit             ICD-10-CM    Benign essential hypertension I10    Glucose intolerance (impaired glucose tolerance) R73.02    Hypercholesteremia E78.00    Class 1 obesity due to excess calories with serious comorbidity and body mass index (BMI) of 34.0 to 34.9 in adult E66.09, Z68.34     Other Visit Diagnoses         Codes    Encounter for wellness examination in adult    -  Primary Z00.00    Relevant Orders    CBC and Auto Differential    Comprehensive Metabolic Panel    Hemoglobin A1C    Lipid Panel    Thyroid Stimulating  Hormone    Magnesium    Vitamin B12             FU in 12 mo +1 day with wellness and labs at [facility]

## 2023-11-30 ENCOUNTER — APPOINTMENT (OUTPATIENT)
Dept: RADIOLOGY | Facility: CLINIC | Age: 58
End: 2023-11-30
Payer: COMMERCIAL

## 2023-12-07 ENCOUNTER — ANCILLARY PROCEDURE (OUTPATIENT)
Dept: RADIOLOGY | Facility: CLINIC | Age: 58
End: 2023-12-07
Payer: COMMERCIAL

## 2023-12-07 DIAGNOSIS — Z12.31 ENCOUNTER FOR SCREENING MAMMOGRAM FOR BREAST CANCER: ICD-10-CM

## 2023-12-07 DIAGNOSIS — Z78.0 POST-MENOPAUSAL: ICD-10-CM

## 2023-12-07 PROCEDURE — 77063 BREAST TOMOSYNTHESIS BI: CPT

## 2023-12-07 PROCEDURE — 77080 DXA BONE DENSITY AXIAL: CPT

## 2023-12-07 PROCEDURE — 77080 DXA BONE DENSITY AXIAL: CPT | Performed by: RADIOLOGY

## 2023-12-07 PROCEDURE — 77063 BREAST TOMOSYNTHESIS BI: CPT | Performed by: RADIOLOGY

## 2023-12-07 PROCEDURE — 77067 SCR MAMMO BI INCL CAD: CPT | Performed by: RADIOLOGY

## 2024-07-16 ENCOUNTER — TELEMEDICINE (OUTPATIENT)
Dept: PRIMARY CARE | Facility: CLINIC | Age: 59
End: 2024-07-16
Payer: COMMERCIAL

## 2024-07-16 DIAGNOSIS — J04.0 LARYNGITIS: ICD-10-CM

## 2024-07-16 DIAGNOSIS — E66.09 CLASS 1 OBESITY DUE TO EXCESS CALORIES WITH SERIOUS COMORBIDITY AND BODY MASS INDEX (BMI) OF 34.0 TO 34.9 IN ADULT: ICD-10-CM

## 2024-07-16 DIAGNOSIS — J01.20 ACUTE NON-RECURRENT ETHMOIDAL SINUSITIS: Primary | ICD-10-CM

## 2024-07-16 DIAGNOSIS — R73.02 GLUCOSE INTOLERANCE (IMPAIRED GLUCOSE TOLERANCE): ICD-10-CM

## 2024-07-16 DIAGNOSIS — R06.00 PND (PAROXYSMAL NOCTURNAL DYSPNEA): ICD-10-CM

## 2024-07-16 PROCEDURE — 99213 OFFICE O/P EST LOW 20 MIN: CPT | Performed by: PHYSICIAN ASSISTANT

## 2024-07-16 PROCEDURE — 1036F TOBACCO NON-USER: CPT | Performed by: PHYSICIAN ASSISTANT

## 2024-07-16 PROCEDURE — 3008F BODY MASS INDEX DOCD: CPT | Performed by: PHYSICIAN ASSISTANT

## 2024-07-16 RX ORDER — AZITHROMYCIN 250 MG/1
TABLET, FILM COATED ORAL
Qty: 6 TABLET | Refills: 0 | Status: SHIPPED | OUTPATIENT
Start: 2024-07-16

## 2024-07-16 RX ORDER — METHYLPREDNISOLONE 4 MG/1
TABLET ORAL
Qty: 21 TABLET | Refills: 0 | Status: SHIPPED | OUTPATIENT
Start: 2024-07-16 | End: 2024-07-23

## 2024-07-16 ASSESSMENT — ENCOUNTER SYMPTOMS
WHEEZING: 0
CHILLS: 0
VOICE CHANGE: 1
FREQUENCY: 0
DIARRHEA: 0
TREMORS: 0
BRUISES/BLEEDS EASILY: 0
EYE REDNESS: 0
CONFUSION: 0
DIZZINESS: 0
ABDOMINAL PAIN: 0
RHINORRHEA: 0
FATIGUE: 1
NAUSEA: 0
NECK PAIN: 0
SINUS PRESSURE: 1
PALPITATIONS: 0
SORE THROAT: 1
FLANK PAIN: 0
EYE DISCHARGE: 0
HEADACHES: 0
NUMBNESS: 0
CHEST TIGHTNESS: 0
SHORTNESS OF BREATH: 0
SINUS PAIN: 0
WOUND: 0
SLEEP DISTURBANCE: 0
VOMITING: 0
CONSTIPATION: 0
COUGH: 1
BACK PAIN: 0
FEVER: 0

## 2024-07-16 NOTE — PROGRESS NOTES
An interactive audio and video telecommunication system which permits real time communication between the patient (at home) and the provider (at the office) was utilized to provide this telehealth service     Virtual or Telephone Consent    Verbal consent was requested and obtained from Maureen Giles on this date, 07/16/24 for a telehealth visit.     Subjective   Patient ID: Maureen Giles is a 59 y.o. female who presents for Illness (SINUS DRAINAGE, COUGH, SORE THROAT X4 DAYS)    HPI    Illness symptoms x 4 days and no improvement   Cough (dry but sounds productive)  that's progressed into laryngitis   Min sinus pressure  Symptoms feel more in her throat not in her chest yet   Denies covid testing   Taking mucinex and robitussin   No fever     med check   HTN - lisinopril   Pain - flexeril and mobic prn   wt loss - she is following with a clinic and is on mounjouro has since been off med but done well maintaining wt loss but questions if she can re-start.  We discussed wegovey or saxenda - not covered or she can reach out to the clinic again   foot pain - plantar fascitis/heel spur - cont to suffer at times. using NSAID only prn and denies following with pod in a while- she notes occasional cramp /spasm into the L calf.  We discussed consider related to dehydration vs the foot issues - will monitor      Preventative testing   mammo - Dec 2021- WNl-scheduled for next week   DEXa- Dec 2019 - WNL- scheduled next week   pap - 2014 -- pt declines at this time  COLONOSCOPY - Cologuard dec 2021- NEG   Fall -NEG nov 2023   PHQ2 NEG NOV 2023        Patient Active Problem List   Diagnosis    Allergic rhinitis    Benign essential hypertension    Bilateral carpal tunnel syndrome    Chronic low back pain with right-sided sciatica    GERD (gastroesophageal reflux disease)    Glucose intolerance (impaired glucose tolerance)    Hypercholesteremia    Insomnia    Lumbar arthropathy    Lumbar neuritis    Disc disease,  degenerative, lumbar or lumbosacral    Right foot pain    Sacroiliitis (CMS-HCC)    Sprain of left upper arm    Weight gain    Class 1 obesity due to excess calories with serious comorbidity and body mass index (BMI) of 34.0 to 34.9 in adult       Review of Systems   Constitutional:  Positive for fatigue. Negative for chills and fever.   HENT:  Positive for congestion, postnasal drip, sinus pressure, sore throat and voice change. Negative for rhinorrhea, sinus pain and tinnitus.    Eyes:  Negative for discharge, redness and visual disturbance.   Respiratory:  Positive for cough. Negative for chest tightness, shortness of breath and wheezing.    Cardiovascular:  Negative for chest pain, palpitations and leg swelling.   Gastrointestinal:  Negative for abdominal pain, constipation, diarrhea, nausea and vomiting.   Endocrine: Negative for cold intolerance and heat intolerance.   Genitourinary:  Negative for flank pain, frequency and urgency.   Musculoskeletal:  Negative for back pain, gait problem and neck pain.   Skin:  Negative for rash and wound.   Neurological:  Negative for dizziness, tremors, syncope, numbness and headaches.   Hematological:  Does not bruise/bleed easily.   Psychiatric/Behavioral:  Negative for confusion, sleep disturbance and suicidal ideas.        Past Medical History:   Diagnosis Date    Encounter for gynecological examination (general) (routine) without abnormal findings     Pap test, as part of routine gynecological examination    Encounter for screening for malignant neoplasm of colon 12/08/2021    COLOGUARD NEG    Immunization not carried out because of patient refusal     Influenza vaccination declined    Personal history of other diseases of the musculoskeletal system and connective tissue     History of low back pain    Personal history of other diseases of the musculoskeletal system and connective tissue     History of muscle spasm    Personal history of other medical treatment     H/O  bone density study    Personal history of other medical treatment     H/O mammogram    Personal history of other specified conditions     History of palpitations       Past Surgical History:   Procedure Laterality Date     SECTION, LOW TRANSVERSE      OTHER SURGICAL HISTORY  2021    Epidural steroid injection    OTHER SURGICAL HISTORY  2021    Facet block       Family History   Problem Relation Name Age of Onset    Heart disease Mother      Arthritis Mother      Diabetes type II Mother      Hearing loss Father      Heart disease Father      Other (heart problem) Father      Diabetes type II Sister      Parkinsonism Brother         Social History     Tobacco Use    Smoking status: Never    Smokeless tobacco: Never   Vaping Use    Vaping status: Never Used   Substance Use Topics    Alcohol use: Yes     Comment: RARE OCCASION    Drug use: Never       No Known Allergies    Current Outpatient Medications   Medication Sig Dispense Refill    calcium carbonate-vit D3-min 600 mg calcium- 400 unit tablet Take 1 tablet by mouth once daily.      lisinopril 10 mg tablet Take 1 tablet (10 mg) by mouth once daily. 90 tablet 3    multivitamin tablet Take 1 tablet by mouth once daily.       No current facility-administered medications for this visit.       Objective   There were no vitals taken for this visit.    Physical Exam  Vitals reviewed.   Constitutional:       Appearance: Normal appearance. She is obese.   HENT:      Head: Normocephalic.   Musculoskeletal:         General: Normal range of motion.   Neurological:      General: No focal deficit present.      Mental Status: She is alert and oriented to person, place, and time.   Psychiatric:         Mood and Affect: Mood normal.         Behavior: Behavior normal.       Testing       Impression    MDM      Reviewed labs and Testing on file   Patient to follow diet low in cholesterol, fat, and sodium.    Patient is advised to increase Exercise.  Patient is  recommended to lose weight.  Reviewed Meds and discussed common side effects  Continue as directed   Consider follow up in 1 week - pt to call if needed   Patient is strongly advised to be compliant with recommendations.    Return to Clinic sooner if needed.  Patient denies further questions/concerns at this time     Assessment/Plan   Problem List Items Addressed This Visit             ICD-10-CM    Glucose intolerance (impaired glucose tolerance) R73.02    Class 1 obesity due to excess calories with serious comorbidity and body mass index (BMI) of 34.0 to 34.9 in adult E66.09, Z68.34     Other Visit Diagnoses         Codes    Acute non-recurrent ethmoidal sinusitis    -  Primary J01.20    Relevant Medications    methylPREDNISolone (Medrol Dospak) 4 mg tablets    azithromycin (Zithromax) 250 mg tablet    Laryngitis     J04.0    Relevant Medications    methylPREDNISolone (Medrol Dospak) 4 mg tablets    PND (paroxysmal nocturnal dyspnea)     R06.00    Relevant Medications    methylPREDNISolone (Medrol Dospak) 4 mg tablets          FU as before    Work slip today and tomorrow

## 2024-11-05 ENCOUNTER — OFFICE VISIT (OUTPATIENT)
Dept: PRIMARY CARE | Facility: CLINIC | Age: 59
End: 2024-11-05
Payer: COMMERCIAL

## 2024-11-05 VITALS
BODY MASS INDEX: 36.64 KG/M2 | HEART RATE: 73 BPM | SYSTOLIC BLOOD PRESSURE: 134 MMHG | DIASTOLIC BLOOD PRESSURE: 82 MMHG | HEIGHT: 66 IN | WEIGHT: 228 LBS

## 2024-11-05 DIAGNOSIS — E66.812 CLASS 2 SEVERE OBESITY DUE TO EXCESS CALORIES WITH SERIOUS COMORBIDITY AND BODY MASS INDEX (BMI) OF 36.0 TO 36.9 IN ADULT: ICD-10-CM

## 2024-11-05 DIAGNOSIS — R73.02 GLUCOSE INTOLERANCE (IMPAIRED GLUCOSE TOLERANCE): ICD-10-CM

## 2024-11-05 DIAGNOSIS — I10 BENIGN ESSENTIAL HYPERTENSION: ICD-10-CM

## 2024-11-05 DIAGNOSIS — T78.40XA ALLERGIC REACTION TO DRUG, INITIAL ENCOUNTER: Primary | ICD-10-CM

## 2024-11-05 DIAGNOSIS — E66.01 CLASS 2 SEVERE OBESITY DUE TO EXCESS CALORIES WITH SERIOUS COMORBIDITY AND BODY MASS INDEX (BMI) OF 36.0 TO 36.9 IN ADULT: ICD-10-CM

## 2024-11-05 PROCEDURE — 3008F BODY MASS INDEX DOCD: CPT | Performed by: PHYSICIAN ASSISTANT

## 2024-11-05 PROCEDURE — 99214 OFFICE O/P EST MOD 30 MIN: CPT | Performed by: PHYSICIAN ASSISTANT

## 2024-11-05 PROCEDURE — 3075F SYST BP GE 130 - 139MM HG: CPT | Performed by: PHYSICIAN ASSISTANT

## 2024-11-05 PROCEDURE — 3079F DIAST BP 80-89 MM HG: CPT | Performed by: PHYSICIAN ASSISTANT

## 2024-11-05 PROCEDURE — 1036F TOBACCO NON-USER: CPT | Performed by: PHYSICIAN ASSISTANT

## 2024-11-05 RX ORDER — METHYLPREDNISOLONE 4 MG/1
TABLET ORAL
Qty: 21 TABLET | Refills: 0 | Status: SHIPPED | OUTPATIENT
Start: 2024-11-05 | End: 2024-11-12

## 2024-11-05 RX ORDER — MELOXICAM 15 MG/1
15 TABLET ORAL DAILY
COMMUNITY
Start: 2024-10-30 | End: 2024-11-05 | Stop reason: SINTOL

## 2024-11-05 ASSESSMENT — ENCOUNTER SYMPTOMS
FLANK PAIN: 0
VOMITING: 0
TREMORS: 0
CHEST TIGHTNESS: 0
SINUS PAIN: 0
NAUSEA: 0
HEADACHES: 0
NUMBNESS: 0
FATIGUE: 0
EYE REDNESS: 0
CONFUSION: 0
BRUISES/BLEEDS EASILY: 0
CHILLS: 0
CONSTIPATION: 0
DIARRHEA: 0
DIZZINESS: 0
FREQUENCY: 0
WOUND: 0
WHEEZING: 0
BACK PAIN: 0
NECK PAIN: 0
EYE DISCHARGE: 0
SORE THROAT: 0
PALPITATIONS: 0
RHINORRHEA: 0
COUGH: 0
FEVER: 0
SLEEP DISTURBANCE: 0
ABDOMINAL PAIN: 0
SHORTNESS OF BREATH: 0

## 2024-11-05 ASSESSMENT — PATIENT HEALTH QUESTIONNAIRE - PHQ9
2. FEELING DOWN, DEPRESSED OR HOPELESS: NOT AT ALL
1. LITTLE INTEREST OR PLEASURE IN DOING THINGS: NOT AT ALL
SUM OF ALL RESPONSES TO PHQ9 QUESTIONS 1 AND 2: 0

## 2024-11-05 NOTE — PROGRESS NOTES
Subjective   Patient ID: Maureen Giles is a 59 y.o. female who presents for Rash (C/O ITCHY FACIAL RASH WITH REDNESS, SWELLING. RECENTLY STARTED MELOXICAM PRESCRIBED BY PODIATRIST FOR PLANTAR FASCIITIS. SHE WAS ALSO OUTSIDE RAKING LEAVES THE OTHER DAY, UNSURE IF EITHER CHANGE IS RELATED. )    Rash  Pertinent negatives include no congestion, cough, diarrhea, fatigue, fever, rhinorrhea, shortness of breath, sore throat or vomiting.       Patient presents today for    1 possible allergic reaction   Pt started mobic for her feet about 5-6 days ago and noted yesterday redness and itchy swelling on face.  She woke up with the symptoms slightly worse. She did take another dose of mobic this morning.  Was to take 1 tab daily x 10 days and is just over half ways through the course of treatment.  She denies other change in soaps, detergents or meds recently.  She denies prior diagnosis of rosacea.  She was outside raking leaves recently but denies itchy rash on other areas of her body. She denies a lump in her throat feeling or difficulty swallowing   Consider development of reaction to lisinopril but less likely given the med changes and hx     2 weight loss   Discussed need for updated labs as set later this month   Discussed trial of adipex with MMT   Discussed injectables - pt to check with insurance and possibly copay cards     med check   HTN - lisinopril   Pain -  wt loss - she is following with a clinic and is on mounjouro has since been off med but done well maintaining wt loss but questions if she can re-start.  We discussed wequin or saxenda - not covered or she can reach out to the clinic again   foot pain - plantar fascitis/heel spur - cont to suffer at times. using NSAID only prn and denies following with pod in a while- she notes occasional cramp /spasm into the L calf.  We discussed consider related to dehydration vs the foot issues - will monitor      Preventative testing   mammo - Dec 2021-  WNl-scheduled for next week   DEXa- Dec 2019 - WNL- scheduled next week   pap - 2014 -- pt declines at this time  COLONOSCOPY - Cologuard dec 2021- NEG   Fall -NEG nov 2023   PHQ2 NEG NOV 2023           Patient Active Problem List   Diagnosis    Allergic rhinitis    Benign essential hypertension    Bilateral carpal tunnel syndrome    Chronic low back pain with right-sided sciatica    GERD (gastroesophageal reflux disease)    Glucose intolerance (impaired glucose tolerance)    Hypercholesteremia    Insomnia    Lumbar arthropathy    Lumbar neuritis    Disc disease, degenerative, lumbar or lumbosacral    Right foot pain    Sacroiliitis (CMS-HCC)    Sprain of left upper arm    Weight gain    Class 1 obesity due to excess calories with serious comorbidity and body mass index (BMI) of 34.0 to 34.9 in adult       Review of Systems   Constitutional:  Negative for chills, fatigue and fever.   HENT:  Negative for congestion, rhinorrhea, sinus pain, sore throat and tinnitus.    Eyes:  Negative for discharge, redness and visual disturbance.   Respiratory:  Negative for cough, chest tightness, shortness of breath and wheezing.    Cardiovascular:  Negative for chest pain, palpitations and leg swelling.   Gastrointestinal:  Negative for abdominal pain, constipation, diarrhea, nausea and vomiting.   Endocrine: Negative for cold intolerance and heat intolerance.   Genitourinary:  Negative for flank pain, frequency and urgency.   Musculoskeletal:  Negative for back pain, gait problem and neck pain.   Skin:  Positive for rash. Negative for wound.   Neurological:  Negative for dizziness, tremors, syncope, numbness and headaches.   Hematological:  Does not bruise/bleed easily.   Psychiatric/Behavioral:  Negative for confusion, sleep disturbance and suicidal ideas.        Past Medical History:   Diagnosis Date    Encounter for gynecological examination (general) (routine) without abnormal findings     Pap test, as part of routine  gynecological examination    Encounter for screening for malignant neoplasm of colon 2021    COLOGUARD NEG    Immunization not carried out because of patient refusal     Influenza vaccination declined    Personal history of other diseases of the musculoskeletal system and connective tissue     History of low back pain    Personal history of other diseases of the musculoskeletal system and connective tissue     History of muscle spasm    Personal history of other medical treatment     H/O bone density study    Personal history of other medical treatment     H/O mammogram    Personal history of other specified conditions     History of palpitations       Past Surgical History:   Procedure Laterality Date     SECTION, LOW TRANSVERSE      OTHER SURGICAL HISTORY  2021    Epidural steroid injection    OTHER SURGICAL HISTORY  2021    Facet block       Family History   Problem Relation Name Age of Onset    Heart disease Mother      Arthritis Mother      Diabetes type II Mother      Hearing loss Father      Heart disease Father      Other (heart problem) Father      Diabetes type II Sister      Parkinsonism Brother         Social History     Tobacco Use    Smoking status: Never    Smokeless tobacco: Never   Vaping Use    Vaping status: Never Used   Substance Use Topics    Alcohol use: Yes     Comment: RARE OCCASION    Drug use: Never       No Known Allergies    Current Outpatient Medications   Medication Sig Dispense Refill    calcium carbonate-vit D3-min 600 mg calcium- 400 unit tablet Take 1 tablet by mouth once daily.      lisinopril 10 mg tablet Take 1 tablet (10 mg) by mouth once daily. 90 tablet 3    meloxicam (Mobic) 15 mg tablet Take 1 tablet (15 mg) by mouth once daily.      multivitamin tablet Take 1 tablet by mouth once daily.      azithromycin (Zithromax) 250 mg tablet Take 2 tabs (500 mg) by mouth today, than 1 daily for 4 days. 6 tablet 0     No current facility-administered  "medications for this visit.       Objective   BP (!) 152/92   Pulse 73   Ht 1.676 m (5' 6\")   Wt 103 kg (228 lb)   BMI 36.80 kg/m²     Physical Exam  Vitals reviewed.   Constitutional:       Appearance: Normal appearance. She is obese.   HENT:      Head: Normocephalic.      Right Ear: External ear normal.      Left Ear: External ear normal.      Nose: Nose normal. No congestion or rhinorrhea.      Mouth/Throat:      Mouth: Mucous membranes are moist.      Pharynx: No posterior oropharyngeal erythema.   Eyes:      Extraocular Movements: Extraocular movements intact.      Conjunctiva/sclera: Conjunctivae normal.      Pupils: Pupils are equal, round, and reactive to light.   Cardiovascular:      Rate and Rhythm: Normal rate and regular rhythm.      Pulses: Normal pulses.   Pulmonary:      Effort: Pulmonary effort is normal.      Breath sounds: Normal breath sounds.   Abdominal:      General: Bowel sounds are normal.      Palpations: Abdomen is soft.      Tenderness: There is no abdominal tenderness. There is no right CVA tenderness or left CVA tenderness.   Musculoskeletal:         General: No tenderness. Normal range of motion.      Cervical back: Normal range of motion and neck supple. No tenderness.   Skin:     General: Skin is warm and dry.      Findings: Erythema and rash present.      Comments: Erythematous rash with small vesicular/papules noted across the forehead and cheeks  Slightly telangectasia noted on the cheeks as well    Neurological:      General: No focal deficit present.      Mental Status: She is alert and oriented to person, place, and time.   Psychiatric:         Mood and Affect: Mood normal.         Behavior: Behavior normal.       Testing   Reviewed labs on file  Reviewed labs ordered to be done in the near future       Impression    MDM    1) COMPLEXITY: 1 UNDIAGNOSED NEW PROBLEM WITH UNCERTAIN PROGNOSIS  2)DATA: TESTS INTERPRETED AND OR ORDERED, TOOK INDEPENDENT HISTORY OR RECORDS " REVIEWED  3)RISK: MODERATE RISK DUE TO NATURE OF MEDICAL CONDITIONS/COMORBIDITY OR MEDICATIONS ORDERED OR SURGICAL OR PROCEDURE REFERRAL, .     Reviewed labs and Testing on file   Patient to follow diet low in cholesterol, fat, and sodium.    Patient is advised to increase Exercise.  Patient is recommended to lose weight.  Reviewed Meds and discussed common side effects  Continue as directed   Suspect allergic reaction to mobic - stop med and discussed zyrtec or benadryl.  Medrol will help resolve symptoms faster.  Unlikely but consider she has developed reaction to lisinopril - monitor closely   Cont with pod   Patient is strongly advised to be compliant with recommendations.    Return to Clinic sooner if needed.  Patient denies further questions/concerns at this time     Assessment/Plan   Problem List Items Addressed This Visit             ICD-10-CM    Benign essential hypertension I10    Glucose intolerance (impaired glucose tolerance) R73.02    Class 2 severe obesity due to excess calories with serious comorbidity and body mass index (BMI) of 36.0 to 36.9 in adult E66.812, E66.01, Z68.36     Other Visit Diagnoses         Codes    Allergic reaction to drug, initial encounter    -  Primary T78.40XA    Relevant Medications    methylPREDNISolone (Medrol Dospak) 4 mg tablets          FU in 1 week - pt to call if needed   FU as before in few weeks with labs and med check

## 2024-11-14 DIAGNOSIS — I10 BENIGN ESSENTIAL HYPERTENSION: ICD-10-CM

## 2024-11-14 RX ORDER — LISINOPRIL 10 MG/1
10 TABLET ORAL DAILY
Qty: 90 TABLET | Refills: 2 | Status: SHIPPED | OUTPATIENT
Start: 2024-11-14

## 2024-11-16 ENCOUNTER — LAB (OUTPATIENT)
Dept: LAB | Facility: LAB | Age: 59
End: 2024-11-16
Payer: COMMERCIAL

## 2024-11-16 DIAGNOSIS — Z00.00 ENCOUNTER FOR WELLNESS EXAMINATION IN ADULT: ICD-10-CM

## 2024-11-16 LAB
ALBUMIN SERPL BCP-MCNC: 3.9 G/DL (ref 3.4–5)
ALP SERPL-CCNC: 87 U/L (ref 33–110)
ALT SERPL W P-5'-P-CCNC: 22 U/L (ref 7–45)
ANION GAP SERPL CALC-SCNC: 12 MMOL/L (ref 10–20)
AST SERPL W P-5'-P-CCNC: 16 U/L (ref 9–39)
BASOPHILS # BLD AUTO: 0.09 X10*3/UL (ref 0–0.1)
BASOPHILS NFR BLD AUTO: 1.3 %
BILIRUB SERPL-MCNC: 0.7 MG/DL (ref 0–1.2)
BUN SERPL-MCNC: 15 MG/DL (ref 6–23)
CALCIUM SERPL-MCNC: 8.8 MG/DL (ref 8.6–10.3)
CHLORIDE SERPL-SCNC: 107 MMOL/L (ref 98–107)
CHOLEST SERPL-MCNC: 202 MG/DL (ref 0–199)
CHOLESTEROL/HDL RATIO: 4.1
CO2 SERPL-SCNC: 25 MMOL/L (ref 21–32)
CREAT SERPL-MCNC: 0.63 MG/DL (ref 0.5–1.05)
EGFRCR SERPLBLD CKD-EPI 2021: >90 ML/MIN/1.73M*2
EOSINOPHIL # BLD AUTO: 0.21 X10*3/UL (ref 0–0.7)
EOSINOPHIL NFR BLD AUTO: 3.1 %
ERYTHROCYTE [DISTWIDTH] IN BLOOD BY AUTOMATED COUNT: 13.2 % (ref 11.5–14.5)
EST. AVERAGE GLUCOSE BLD GHB EST-MCNC: 126 MG/DL
GLUCOSE SERPL-MCNC: 101 MG/DL (ref 74–99)
HBA1C MFR BLD: 6 %
HCT VFR BLD AUTO: 43.6 % (ref 36–46)
HDLC SERPL-MCNC: 49 MG/DL
HGB BLD-MCNC: 14 G/DL (ref 12–16)
IMM GRANULOCYTES # BLD AUTO: 0.04 X10*3/UL (ref 0–0.7)
IMM GRANULOCYTES NFR BLD AUTO: 0.6 % (ref 0–0.9)
LDLC SERPL CALC-MCNC: 128 MG/DL
LYMPHOCYTES # BLD AUTO: 1.6 X10*3/UL (ref 1.2–4.8)
LYMPHOCYTES NFR BLD AUTO: 23.8 %
MAGNESIUM SERPL-MCNC: 1.94 MG/DL (ref 1.6–2.4)
MCH RBC QN AUTO: 28.2 PG (ref 26–34)
MCHC RBC AUTO-ENTMCNC: 32.1 G/DL (ref 32–36)
MCV RBC AUTO: 88 FL (ref 80–100)
MONOCYTES # BLD AUTO: 0.52 X10*3/UL (ref 0.1–1)
MONOCYTES NFR BLD AUTO: 7.7 %
NEUTROPHILS # BLD AUTO: 4.25 X10*3/UL (ref 1.2–7.7)
NEUTROPHILS NFR BLD AUTO: 63.5 %
NON HDL CHOLESTEROL: 153 MG/DL (ref 0–149)
NRBC BLD-RTO: 0 /100 WBCS (ref 0–0)
PLATELET # BLD AUTO: 263 X10*3/UL (ref 150–450)
POTASSIUM SERPL-SCNC: 4.2 MMOL/L (ref 3.5–5.3)
PROT SERPL-MCNC: 6.5 G/DL (ref 6.4–8.2)
RBC # BLD AUTO: 4.96 X10*6/UL (ref 4–5.2)
SODIUM SERPL-SCNC: 140 MMOL/L (ref 136–145)
TRIGL SERPL-MCNC: 126 MG/DL (ref 0–149)
TSH SERPL-ACNC: 1.74 MIU/L (ref 0.44–3.98)
VIT B12 SERPL-MCNC: 290 PG/ML (ref 211–911)
VLDL: 25 MG/DL (ref 0–40)
WBC # BLD AUTO: 6.7 X10*3/UL (ref 4.4–11.3)

## 2024-11-16 PROCEDURE — 36415 COLL VENOUS BLD VENIPUNCTURE: CPT

## 2024-11-16 PROCEDURE — 80061 LIPID PANEL: CPT

## 2024-11-16 PROCEDURE — 85025 COMPLETE CBC W/AUTO DIFF WBC: CPT

## 2024-11-16 PROCEDURE — 80053 COMPREHEN METABOLIC PANEL: CPT

## 2024-11-16 PROCEDURE — 83036 HEMOGLOBIN GLYCOSYLATED A1C: CPT

## 2024-11-16 PROCEDURE — 83735 ASSAY OF MAGNESIUM: CPT

## 2024-11-16 PROCEDURE — 84443 ASSAY THYROID STIM HORMONE: CPT

## 2024-11-16 PROCEDURE — 82607 VITAMIN B-12: CPT

## 2024-11-25 ENCOUNTER — APPOINTMENT (OUTPATIENT)
Dept: PRIMARY CARE | Facility: CLINIC | Age: 59
End: 2024-11-25
Payer: COMMERCIAL

## 2024-11-25 VITALS
WEIGHT: 230.6 LBS | BODY MASS INDEX: 37.06 KG/M2 | HEIGHT: 66 IN | SYSTOLIC BLOOD PRESSURE: 139 MMHG | HEART RATE: 78 BPM | DIASTOLIC BLOOD PRESSURE: 83 MMHG

## 2024-11-25 DIAGNOSIS — I10 BENIGN ESSENTIAL HYPERTENSION: ICD-10-CM

## 2024-11-25 DIAGNOSIS — E66.01 CLASS 2 SEVERE OBESITY DUE TO EXCESS CALORIES WITH SERIOUS COMORBIDITY AND BODY MASS INDEX (BMI) OF 37.0 TO 37.9 IN ADULT: ICD-10-CM

## 2024-11-25 DIAGNOSIS — E66.812 CLASS 2 SEVERE OBESITY DUE TO EXCESS CALORIES WITH SERIOUS COMORBIDITY AND BODY MASS INDEX (BMI) OF 37.0 TO 37.9 IN ADULT: ICD-10-CM

## 2024-11-25 DIAGNOSIS — R79.89 LOW VITAMIN B12 LEVEL: ICD-10-CM

## 2024-11-25 DIAGNOSIS — Z00.00 ENCOUNTER FOR WELLNESS EXAMINATION IN ADULT: Primary | ICD-10-CM

## 2024-11-25 DIAGNOSIS — R73.02 GLUCOSE INTOLERANCE (IMPAIRED GLUCOSE TOLERANCE): ICD-10-CM

## 2024-11-25 DIAGNOSIS — M79.671 RIGHT FOOT PAIN: ICD-10-CM

## 2024-11-25 DIAGNOSIS — E78.00 HYPERCHOLESTEREMIA: ICD-10-CM

## 2024-11-25 PROCEDURE — 99396 PREV VISIT EST AGE 40-64: CPT | Performed by: PHYSICIAN ASSISTANT

## 2024-11-25 PROCEDURE — 3008F BODY MASS INDEX DOCD: CPT | Performed by: PHYSICIAN ASSISTANT

## 2024-11-25 PROCEDURE — 3075F SYST BP GE 130 - 139MM HG: CPT | Performed by: PHYSICIAN ASSISTANT

## 2024-11-25 PROCEDURE — 1036F TOBACCO NON-USER: CPT | Performed by: PHYSICIAN ASSISTANT

## 2024-11-25 PROCEDURE — 96372 THER/PROPH/DIAG INJ SC/IM: CPT | Performed by: PHYSICIAN ASSISTANT

## 2024-11-25 PROCEDURE — 3079F DIAST BP 80-89 MM HG: CPT | Performed by: PHYSICIAN ASSISTANT

## 2024-11-25 RX ORDER — CYANOCOBALAMIN 1000 UG/ML
1000 INJECTION, SOLUTION INTRAMUSCULAR; SUBCUTANEOUS ONCE
Status: COMPLETED | OUTPATIENT
Start: 2024-11-25 | End: 2024-11-25

## 2024-11-25 RX ORDER — LANOLIN ALCOHOL/MO/W.PET/CERES
1000 CREAM (GRAM) TOPICAL DAILY
Qty: 90 TABLET | Refills: 3 | Status: SHIPPED | OUTPATIENT
Start: 2024-11-25 | End: 2025-11-25

## 2024-11-25 ASSESSMENT — ENCOUNTER SYMPTOMS
WOUND: 0
NECK PAIN: 0
DIZZINESS: 0
VOMITING: 0
NAUSEA: 0
FEVER: 0
SLEEP DISTURBANCE: 0
NUMBNESS: 0
BACK PAIN: 0
EYE DISCHARGE: 0
SHORTNESS OF BREATH: 0
RHINORRHEA: 0
FREQUENCY: 0
ABDOMINAL PAIN: 0
FLANK PAIN: 0
TREMORS: 0
EYE REDNESS: 0
WHEEZING: 0
PALPITATIONS: 0
HEADACHES: 0
CHEST TIGHTNESS: 0
CHILLS: 0
BRUISES/BLEEDS EASILY: 0
SINUS PAIN: 0
DIARRHEA: 0
COUGH: 0
SORE THROAT: 0
CONFUSION: 0
FATIGUE: 0
CONSTIPATION: 0

## 2024-11-25 ASSESSMENT — PATIENT HEALTH QUESTIONNAIRE - PHQ9
2. FEELING DOWN, DEPRESSED OR HOPELESS: NOT AT ALL
SUM OF ALL RESPONSES TO PHQ9 QUESTIONS 1 AND 2: 0
1. LITTLE INTEREST OR PLEASURE IN DOING THINGS: NOT AT ALL

## 2024-11-25 NOTE — PROGRESS NOTES
Subjective   Patient ID: Maureen Giles is a 59 y.o. female who presents for Follow-up (WELLNESS WITH LABS)    HPI    Wellness   Waist measurement 47inchs      Labs      med check   HTN - lisinopril - monitor home readings and discussed if BP >130/>80 consistently consider need to inc meds   Pain - f  wt loss - she is following with a clinic and is on mounjouro has since been off med but done well maintaining wt loss but questions if she can re-start.  We discussed wegovey or saxenda - not covered or she can reach out to the clinic again   foot pain - plantar fascitis/heel spur - cont to suffer at times. using NSAID only prn- following with pod and in therapy       Preventative testing   mammo - Dec 2023-   DEXa- Dec 2023 - WNL  pap - 2014 -- pt declines at this time  COLONOSCOPY - Cologuard dec 2021- NEG   Fall -NEG nov 2024   PHQ2 NEG NOV 2024      - Other Providers  Eye - follows annually - [facility]  [facility] - every 6 mo   Pod - Dr Martin - fall 2024      - Vaccines   Flu - declines   PNA - suggest age 65  COVID - Moderna Jan and FEb 2021 and Booster in Dec 2021   Shingles- suggest age 50 - suggest shingrix - pt states is too exp at this time   Dtap - Nov 16, 2024  MMR -   Hep B              Patient Active Problem List   Diagnosis    Allergic rhinitis    Benign essential hypertension    Bilateral carpal tunnel syndrome    Chronic low back pain with right-sided sciatica    GERD (gastroesophageal reflux disease)    Glucose intolerance (impaired glucose tolerance)    Hypercholesteremia    Insomnia    Lumbar arthropathy    Lumbar neuritis    Disc disease, degenerative, lumbar or lumbosacral    Right foot pain    Sacroiliitis (CMS-HCC)    Sprain of left upper arm    Weight gain    Class 2 severe obesity due to excess calories with serious comorbidity and body mass index (BMI) of 36.0 to 36.9 in adult       Review of Systems   Constitutional:  Negative for chills, fatigue and fever.   HENT:  Negative for  congestion, rhinorrhea, sinus pain, sore throat and tinnitus.    Eyes:  Negative for discharge, redness and visual disturbance.   Respiratory:  Negative for cough, chest tightness, shortness of breath and wheezing.    Cardiovascular:  Negative for chest pain, palpitations and leg swelling.   Gastrointestinal:  Negative for abdominal pain, constipation, diarrhea, nausea and vomiting.   Endocrine: Negative for cold intolerance and heat intolerance.   Genitourinary:  Negative for flank pain, frequency and urgency.   Musculoskeletal:  Negative for back pain, gait problem and neck pain.   Skin:  Negative for rash and wound.   Neurological:  Negative for dizziness, tremors, syncope, numbness and headaches.   Hematological:  Does not bruise/bleed easily.   Psychiatric/Behavioral:  Negative for confusion, sleep disturbance and suicidal ideas.        Past Medical History:   Diagnosis Date    Encounter for gynecological examination (general) (routine) without abnormal findings     Pap test, as part of routine gynecological examination    Encounter for screening for malignant neoplasm of colon 2021    COLOGUARD NEG    Immunization not carried out because of patient refusal     Influenza vaccination declined    Personal history of other diseases of the musculoskeletal system and connective tissue     History of low back pain    Personal history of other diseases of the musculoskeletal system and connective tissue     History of muscle spasm    Personal history of other medical treatment     H/O bone density study    Personal history of other medical treatment     H/O mammogram    Personal history of other specified conditions     History of palpitations       Past Surgical History:   Procedure Laterality Date     SECTION, LOW TRANSVERSE      OTHER SURGICAL HISTORY  2021    Epidural steroid injection    OTHER SURGICAL HISTORY  2021    Facet block       Family History   Problem Relation Name Age of Onset  "   Heart disease Mother      Arthritis Mother      Diabetes type II Mother      Hearing loss Father      Heart disease Father      Other (heart problem) Father      Diabetes type II Sister      Parkinsonism Brother         Social History     Tobacco Use    Smoking status: Never    Smokeless tobacco: Never   Vaping Use    Vaping status: Never Used   Substance Use Topics    Alcohol use: Yes     Comment: RARE OCCASION    Drug use: Never       Allergies   Allergen Reactions    Meloxicam Rash       Current Outpatient Medications   Medication Sig Dispense Refill    calcium carbonate-vit D3-min 600 mg calcium- 400 unit tablet Take 1 tablet by mouth once daily.      lisinopril 10 mg tablet TAKE 1 TABLET BY MOUTH EVERY DAY 90 tablet 2    multivitamin tablet Take 1 tablet by mouth once daily.       No current facility-administered medications for this visit.       Objective   /83   Pulse 78   Ht 1.676 m (5' 6\")   Wt 105 kg (230 lb 9.6 oz)   BMI 37.22 kg/m²     Physical Exam  Constitutional:       Appearance: Normal appearance.   HENT:      Head: Normocephalic.      Right Ear: External ear normal.      Left Ear: External ear normal.      Nose: Nose normal. No congestion or rhinorrhea.      Mouth/Throat:      Mouth: Mucous membranes are moist.   Eyes:      Extraocular Movements: Extraocular movements intact.      Conjunctiva/sclera: Conjunctivae normal.      Pupils: Pupils are equal, round, and reactive to light.   Cardiovascular:      Rate and Rhythm: Normal rate and regular rhythm.      Pulses: Normal pulses.   Pulmonary:      Effort: Pulmonary effort is normal.      Breath sounds: Normal breath sounds.   Abdominal:      General: Bowel sounds are normal.      Palpations: Abdomen is soft.      Tenderness: There is no abdominal tenderness. There is no right CVA tenderness or left CVA tenderness.   Musculoskeletal:         General: No tenderness. Normal range of motion.      Cervical back: Normal range of motion and " neck supple. No tenderness.   Skin:     General: Skin is warm and dry.   Neurological:      General: No focal deficit present.      Mental Status: She is alert and oriented to person, place, and time.   Psychiatric:         Mood and Affect: Mood normal.         Behavior: Behavior normal.     Testing     Component      Latest Ref Rng 11/16/2024   WBC      4.4 - 11.3 x10*3/uL 6.7    nRBC      0.0 - 0.0 /100 WBCs 0.0    RBC      4.00 - 5.20 x10*6/uL 4.96    HEMOGLOBIN      12.0 - 16.0 g/dL 14.0    HEMATOCRIT      36.0 - 46.0 % 43.6    MCV      80 - 100 fL 88    MCH      26.0 - 34.0 pg 28.2    MCHC      32.0 - 36.0 g/dL 32.1    RED CELL DISTRIBUTION WIDTH      11.5 - 14.5 % 13.2    Platelets      150 - 450 x10*3/uL 263    Neutrophils %      40.0 - 80.0 % 63.5    Immature Granulocytes %, Automated      0.0 - 0.9 % 0.6    Lymphocytes %      13.0 - 44.0 % 23.8    Monocytes %      2.0 - 10.0 % 7.7    Eosinophils %      0.0 - 6.0 % 3.1    Basophils %      0.0 - 2.0 % 1.3    Neutrophils Absolute      1.20 - 7.70 x10*3/uL 4.25    Immature Granulocytes Absolute, Automated      0.00 - 0.70 x10*3/uL 0.04    Lymphocytes Absolute      1.20 - 4.80 x10*3/uL 1.60    Monocytes Absolute      0.10 - 1.00 x10*3/uL 0.52    Eosinophils Absolute      0.00 - 0.70 x10*3/uL 0.21    Basophils Absolute      0.00 - 0.10 x10*3/uL 0.09    GLUCOSE      74 - 99 mg/dL 101 (H)    SODIUM      136 - 145 mmol/L 140    POTASSIUM      3.5 - 5.3 mmol/L 4.2    CHLORIDE      98 - 107 mmol/L 107    Bicarbonate      21 - 32 mmol/L 25    Anion Gap      10 - 20 mmol/L 12    Blood Urea Nitrogen      6 - 23 mg/dL 15    Creatinine      0.50 - 1.05 mg/dL 0.63    EGFR      >60 mL/min/1.73m*2 >90    Calcium      8.6 - 10.3 mg/dL 8.8    Albumin      3.4 - 5.0 g/dL 3.9    Alkaline Phosphatase      33 - 110 U/L 87    Total Protein      6.4 - 8.2 g/dL 6.5    AST      9 - 39 U/L 16    Bilirubin Total      0.0 - 1.2 mg/dL 0.7    ALT      7 - 45 U/L 22    CHOLESTEROL      0 -  199 mg/dL 202 (H)    HDL CHOLESTEROL      mg/dL 49.0    Cholesterol/HDL Ratio 4.1    LDL Calculated      <=99 mg/dL 128 (H)    VLDL      0 - 40 mg/dL 25    TRIGLYCERIDES      0 - 149 mg/dL 126    Non HDL Cholesterol      0 - 149 mg/dL 153 (H)    Hemoglobin A1C      See comment % 6.0 (H)    Estimated Average Glucose      Not Established mg/dL 126    Thyroid Stimulating Hormone      0.44 - 3.98 mIU/L 1.74    MAGNESIUM      1.60 - 2.40 mg/dL 1.94    Vitamin B12      211 - 911 pg/mL 290       Impression    MDM    1) COMPLEXITY: MORE THAN 1 STABLE CHRONIC CONDITION ADDRESSED  2)DATA: TESTS INTERPRETED AND OR ORDERED, TOOK INDEPENDENT HISTORY OR RECORDS REVIEWED  3)RISK: MODERATE RISK DUE TO NATURE OF MEDICAL CONDITIONS/COMORBIDITY OR MEDICATIONS ORDERED OR SURGICAL OR PROCEDURE REFERRAL, .       Reviewed labs and Testing on file   Patient to follow diet low in cholesterol, fat, and sodium.    Patient is advised to increase Exercise.  Patient is recommended to lose weight.  Reviewed Meds and discussed common side effects  Continue as directed   B12 injection and start supplement   Cont other meds   Monitor BP   Discussed approp preventative testing, vaccines and recommendations   Patient is strongly advised to be compliant with recommendations.    Return to Clinic sooner if needed.  Patient denies further questions/concerns at this time     Assessment/Plan   Problem List Items Addressed This Visit             ICD-10-CM    Benign essential hypertension I10    Relevant Orders    CBC and Auto Differential    Comprehensive Metabolic Panel    Hemoglobin A1C    Lipid Panel    Thyroid Stimulating Hormone    Thyroxine, Free    Magnesium    Vitamin B12    Glucose intolerance (impaired glucose tolerance) R73.02    Relevant Orders    Hemoglobin A1C    Hypercholesteremia E78.00    Relevant Orders    Lipid Panel    Thyroid Stimulating Hormone    Thyroxine, Free    Right foot pain M79.671    Class 2 severe obesity due to excess  calories with serious comorbidity and body mass index (BMI) of 37.0 to 37.9 in adult E66.812, E66.01, Z68.37     Other Visit Diagnoses         Codes    Encounter for wellness examination in adult    -  Primary Z00.00    Relevant Orders    CBC and Auto Differential    Comprehensive Metabolic Panel    Hemoglobin A1C    Lipid Panel    Thyroid Stimulating Hormone    Thyroxine, Free    Magnesium    Vitamin B12    Low vitamin B12 level     R79.89    Relevant Medications    cyanocobalamin (Vitamin B-12) injection 1,000 mcg (Completed)    cyanocobalamin (Vitamin B-12) 1,000 mcg tablet    Other Relevant Orders    Vitamin B12          Wellness form completed    FU in 12 +1 for wellness and labs to be done nov 17 or after   Consider wellness in 6 mo if approp with her insurance

## 2025-01-02 ENCOUNTER — TELEMEDICINE (OUTPATIENT)
Dept: PRIMARY CARE | Facility: CLINIC | Age: 60
End: 2025-01-02
Payer: COMMERCIAL

## 2025-01-02 VITALS — BODY MASS INDEX: 36.96 KG/M2 | WEIGHT: 230 LBS | HEIGHT: 66 IN

## 2025-01-02 DIAGNOSIS — J01.00 ACUTE NON-RECURRENT MAXILLARY SINUSITIS: Primary | ICD-10-CM

## 2025-01-02 PROCEDURE — 99214 OFFICE O/P EST MOD 30 MIN: CPT | Performed by: NURSE PRACTITIONER

## 2025-01-02 PROCEDURE — 1036F TOBACCO NON-USER: CPT | Performed by: NURSE PRACTITIONER

## 2025-01-02 PROCEDURE — 3008F BODY MASS INDEX DOCD: CPT | Performed by: NURSE PRACTITIONER

## 2025-01-02 RX ORDER — PREDNISONE 10 MG/1
30 TABLET ORAL DAILY
Qty: 15 TABLET | Refills: 0 | Status: SHIPPED | OUTPATIENT
Start: 2025-01-02

## 2025-01-02 RX ORDER — AZITHROMYCIN 250 MG/1
TABLET, FILM COATED ORAL
Qty: 6 TABLET | Refills: 0 | Status: SHIPPED | OUTPATIENT
Start: 2025-01-02 | End: 2025-01-06

## 2025-01-02 ASSESSMENT — ENCOUNTER SYMPTOMS
JOINT SWELLING: 0
SORE THROAT: 0
NAUSEA: 0
DYSURIA: 0
SHORTNESS OF BREATH: 0
ABDOMINAL PAIN: 0
ARTHRALGIAS: 0
NECK PAIN: 0
SLEEP DISTURBANCE: 0
SPEECH DIFFICULTY: 0
FEVER: 0
CHILLS: 0
CHEST TIGHTNESS: 0
ABDOMINAL DISTENTION: 0
CONSTIPATION: 0
FATIGUE: 1
EYE REDNESS: 0
NUMBNESS: 0
WHEEZING: 0
CONFUSION: 0
VOMITING: 0
COUGH: 1
WEAKNESS: 0
HEADACHES: 0
HEMATURIA: 0
SEIZURES: 0
DIZZINESS: 0
NERVOUS/ANXIOUS: 0
SINUS PRESSURE: 1
FREQUENCY: 0
DIFFICULTY URINATING: 0
WOUND: 0
FLANK PAIN: 0
PHOTOPHOBIA: 0
TROUBLE SWALLOWING: 0
APNEA: 0
EYE PAIN: 0
CHOKING: 0
BLOOD IN STOOL: 0
DIARRHEA: 0
BACK PAIN: 0
PALPITATIONS: 0
FACIAL ASYMMETRY: 0
MYALGIAS: 0
UNEXPECTED WEIGHT CHANGE: 0

## 2025-01-02 ASSESSMENT — PATIENT HEALTH QUESTIONNAIRE - PHQ9
SUM OF ALL RESPONSES TO PHQ9 QUESTIONS 1 AND 2: 0
1. LITTLE INTEREST OR PLEASURE IN DOING THINGS: NOT AT ALL
2. FEELING DOWN, DEPRESSED OR HOPELESS: NOT AT ALL

## 2025-01-02 NOTE — PROGRESS NOTES
"Subjective   Patient ID: Maureen Giles is a 59 y.o. female who presents for Cough (C/O COUGH, DRAINAGE, PT STATES THAT SHE HAD THIS IN EARLY DECEMBER AND HAS COME BACK).    Virtual or Telephone Consent    An interactive audio and video telecommunication system which permits real time communications between the patient (at the originating site) and provider (at the distant site) was utilized to provide this telehealth service.   Verbal consent was requested and obtained from Maureen Giles on this date, 01/02/25 for a telehealth visit.     HPI:  Presents today for C/O COUGH X 1 WEEK  modifying factors consists of COVID NEGATIVE  associated symptoms consist of NASAL CONGESTION. SINUS PRESSURE. NO FEVER , SOB, OR CP  prior treatment consists of medication NONE    Visit Vitals  Ht 1.676 m (5' 6\")   Wt 104 kg (230 lb)   BMI 37.12 kg/m²   Smoking Status Never   BSA 2.2 m²        Review of Systems   Constitutional:  Positive for fatigue. Negative for chills, fever and unexpected weight change.   HENT:  Positive for congestion and sinus pressure. Negative for ear pain, sore throat and trouble swallowing.    Eyes:  Negative for photophobia, pain, redness and visual disturbance.   Respiratory:  Positive for cough. Negative for apnea, choking, chest tightness, shortness of breath and wheezing.    Cardiovascular:  Negative for chest pain, palpitations and leg swelling.   Gastrointestinal:  Negative for abdominal distention, abdominal pain, blood in stool, constipation, diarrhea, nausea and vomiting.   Genitourinary:  Negative for difficulty urinating, dysuria, flank pain, frequency, hematuria and urgency.   Musculoskeletal:  Negative for arthralgias, back pain, gait problem, joint swelling, myalgias and neck pain.   Skin:  Negative for rash and wound.   Neurological:  Negative for dizziness, seizures, syncope, facial asymmetry, speech difficulty, weakness, numbness and headaches.   Psychiatric/Behavioral:  Negative for " confusion, sleep disturbance and suicidal ideas. The patient is not nervous/anxious.        Objective     Physical Exam  Neurological:      Mental Status: She is alert and oriented to person, place, and time.   Psychiatric:         Mood and Affect: Mood normal.         Behavior: Behavior normal.         Thought Content: Thought content normal.         Judgment: Judgment normal.            Assessment/Plan   Problem List Items Addressed This Visit    None  Visit Diagnoses       Acute non-recurrent maxillary sinusitis    -  Primary    Relevant Medications    azithromycin (Zithromax) 250 mg tablet    predniSONE (Deltasone) 10 mg tablet           INCREASE FLUID INTAKE AND TAKE TYLENOL 650 MG PO Q6H/PRN FOR PAIN OR FEVER. RETURN SOONER OR GO TO THE ER IF SYMPTOMS PERSIST OR WORSEN    WE DISCUSSED MOST COMMON SIDE EFFECTS OF PRESCRIBED MEDICATIONS. INDICATIONS, RISK, COMPLICATIONS, AND ALTERNATIVES OF MEDICATION/THERAPEUTICS WERE EXPLAINED AND DISCUSSED. PLEASE MONITOR CLOSELY FOR ANY UNTOWARD SIDE EFFECTS OR COMPLICATIONS OF MEDICATIONS. PATIENT IS STRONGLY ADVISED TO BE COMPLIANT WITH RECOMMENDATIONS. QUESTIONS AND CONCERNS WERE ADDRESSED. INSTRUCTED TO CALL, RETURN SOONER, OR GO TO THE ER,  IF SYMPTOMS PERSIST OR WORSEN. THEY VOICED UNDERSTANDING AND  DENIES FURTHER QUESTIONS AT THIS TIME.    TIME CODE  1. PREPARATION FOR PATIENT'S VISIT (REVIEWING CHART, CURRENT MEDICAL RECORDS, OUTSIDE HEALTH PROVIDER RECORDS, PREVIOUS HISTORY, EXAM, TEST, PROCEDURE, AND MEDICATIONS)  2. FACE TO FACE ENCOUNTER OBTAINING HISTORY FROM THE PATIENT/FAMILY/CAREGIVERS; PERFORMING EVALUATION AND EXAMINATION; ORDERING TESTS OR PROCEDURES; REFERRING AND COMMUNICATING WITH OTHER HEALTHCARE PROVIDERS; COUNSELING AND EDUCATION OF THE PATIENT/FAMILY/CAREGIVERS; INDEPENDENTLY INTERPRETING RESULTS (TESTS, LABS, PROCEDURES, IMAGING) AND COMMUNICATING AND EXPLAINING RESULTS TO THE PATIENT/FAMILY/CAREGIVERS  3. COORDINATION OF CARE; PREPARING AND  PRINTING DISCHARGE INSTRUCTIONS AND ANY EDUCATIONAL MATERIAL FOR THE PATIENT/FAMILY/CAREGIVERS. DOCUMENTING CLINICAL INFORMATION IN THE ELECTRONIC MEDICAL RECORD   4. REVIEWING OARRS AS NEEDED    MDM  1) COMPLEXITY: MORE THAN 1 STABLE CHRONIC CONDITION ADDRESSED OR 1 ACUTE ILLNESS ADDRESSED   2)DATA: TESTS INTERPRETED AND OR ORDERED, TOOK INDEPENDENT HISTORY OR RECORDS REVIEWED  3)RISK: MODERATE RISK DUE TO NATURE OF MEDICAL CONDITIONS/COMORBIDITY OR MEDICATIONS ORDERED OR SURGICAL OR PROCEDURE REFERRAL    Follow up as before

## 2025-01-06 ENCOUNTER — TELEMEDICINE (OUTPATIENT)
Dept: PRIMARY CARE | Facility: CLINIC | Age: 60
End: 2025-01-06
Payer: COMMERCIAL

## 2025-01-06 ENCOUNTER — HOSPITAL ENCOUNTER (OUTPATIENT)
Dept: RADIOLOGY | Facility: HOSPITAL | Age: 60
Discharge: HOME | End: 2025-01-06
Payer: COMMERCIAL

## 2025-01-06 DIAGNOSIS — R05.1 ACUTE COUGH: ICD-10-CM

## 2025-01-06 DIAGNOSIS — E66.812 CLASS 2 SEVERE OBESITY DUE TO EXCESS CALORIES WITH SERIOUS COMORBIDITY AND BODY MASS INDEX (BMI) OF 37.0 TO 37.9 IN ADULT: ICD-10-CM

## 2025-01-06 DIAGNOSIS — E66.01 CLASS 2 SEVERE OBESITY DUE TO EXCESS CALORIES WITH SERIOUS COMORBIDITY AND BODY MASS INDEX (BMI) OF 37.0 TO 37.9 IN ADULT: ICD-10-CM

## 2025-01-06 DIAGNOSIS — J01.00 ACUTE NON-RECURRENT MAXILLARY SINUSITIS: Primary | ICD-10-CM

## 2025-01-06 DIAGNOSIS — R73.02 GLUCOSE INTOLERANCE (IMPAIRED GLUCOSE TOLERANCE): ICD-10-CM

## 2025-01-06 PROCEDURE — 1036F TOBACCO NON-USER: CPT | Performed by: PHYSICIAN ASSISTANT

## 2025-01-06 PROCEDURE — 99214 OFFICE O/P EST MOD 30 MIN: CPT | Performed by: PHYSICIAN ASSISTANT

## 2025-01-06 PROCEDURE — 71046 X-RAY EXAM CHEST 2 VIEWS: CPT | Performed by: RADIOLOGY

## 2025-01-06 PROCEDURE — 71046 X-RAY EXAM CHEST 2 VIEWS: CPT

## 2025-01-06 RX ORDER — PROMETHAZINE HYDROCHLORIDE AND DEXTROMETHORPHAN HYDROBROMIDE 6.25; 15 MG/5ML; MG/5ML
5 SYRUP ORAL 4 TIMES DAILY PRN
Qty: 120 ML | Refills: 0 | Status: SHIPPED | OUTPATIENT
Start: 2025-01-06 | End: 2025-02-05

## 2025-01-06 RX ORDER — AMOXICILLIN AND CLAVULANATE POTASSIUM 875; 125 MG/1; MG/1
875 TABLET, FILM COATED ORAL 2 TIMES DAILY
Qty: 20 TABLET | Refills: 0 | Status: SHIPPED | OUTPATIENT
Start: 2025-01-06 | End: 2025-01-16

## 2025-01-06 RX ORDER — FLUCONAZOLE 150 MG/1
150 TABLET ORAL
Qty: 3 TABLET | Refills: 0 | Status: SHIPPED | OUTPATIENT
Start: 2025-01-06

## 2025-01-06 ASSESSMENT — ENCOUNTER SYMPTOMS
SINUS PRESSURE: 1
FEVER: 0
FATIGUE: 1
CHEST TIGHTNESS: 0
RHINORRHEA: 0
NAUSEA: 0
SLEEP DISTURBANCE: 0
ABDOMINAL PAIN: 0
COUGH: 1
CONFUSION: 0
HEADACHES: 1
EYE REDNESS: 0
EYE DISCHARGE: 0
VOMITING: 0
FLANK PAIN: 0
NECK PAIN: 0
TREMORS: 0
SHORTNESS OF BREATH: 0
CHILLS: 0
DIZZINESS: 0
CONSTIPATION: 0
BACK PAIN: 0
FREQUENCY: 0
DIARRHEA: 0
SINUS PAIN: 0
WOUND: 0
WHEEZING: 0
SORE THROAT: 1
BRUISES/BLEEDS EASILY: 0
NUMBNESS: 0
PALPITATIONS: 0

## 2025-01-06 NOTE — PROGRESS NOTES
An interactive audio and video telecommunication system which permits real time communication between the patient (at home) and the provider (at the office) was utilized to provide this telehealth service   Virtual or Telephone Consent      Verbal consent was requested and obtained from Maureen Giles on this date, 01/06/25 for a telehealth visit.   Subjective   Patient ID: Maureen Giles is a 59 y.o. female who presents for Illness (ILLNESS F/U. NOT FEELING ANY BETTER C/O DRAINAGE COUGH WITH CONGESTION SORE THROAT FATIGUE)    HPI    Patient presents today for illness follow up   She saw kassi Wood 1/2/25 and had already noted symptoms for 1 week of nasal congesiton, sinus pressure and cough.  She denies fever through all of this.  Previously she did a covid test and was NEG  Kassi treated her as a sinus infection and started her on zpack which she has completed but is still in her system and 5 days of steroid.  Unfort she denies any pos change in her symptoms  We discussed possibly flu but less likely without the fever.  Consider viral but she should be turning the corner any day if this was the case.  Consider this is bacterial but zpack was not effective.  Given the 2 week duration and no change I think it is approp to change the Abx - will try augmentin and cough med.  CXR ordered for if needed   Discussed follow up in 1-2 week for illness check  - pt states will call if needed     med check   HTN - lisinopril - monitor home readings and discussed if BP >130/>80 consistently consider need to inc meds   Pain - f  wt loss - she is following with a clinic and is on mounjouro has since been off med but done well maintaining wt loss but questions if she can re-start.  We discussed wegovey or saxenda - not covered or she can reach out to the clinic again   foot pain - plantar fascitis/heel spur - cont to suffer at times. using NSAID only prn- following with pod and in therapy       Preventative testing   mammo -  Dec 2023-   DEXa- Dec 2023 - WNL  pap - 2014 -- pt declines at this time  COLONOSCOPY - Cologuard dec 2021- NEG   Fall -NEG nov 2024   PHQ2 NEG NOV 2024     Patient Active Problem List   Diagnosis    Allergic rhinitis    Benign essential hypertension    Bilateral carpal tunnel syndrome    Chronic low back pain with right-sided sciatica    GERD (gastroesophageal reflux disease)    Glucose intolerance (impaired glucose tolerance)    Hypercholesteremia    Insomnia    Lumbar arthropathy    Lumbar neuritis    Disc disease, degenerative, lumbar or lumbosacral    Right foot pain    Sacroiliitis (CMS-HCC)    Sprain of left upper arm    Weight gain    Class 2 severe obesity due to excess calories with serious comorbidity and body mass index (BMI) of 37.0 to 37.9 in adult       Review of Systems   Constitutional:  Positive for fatigue. Negative for chills and fever.   HENT:  Positive for congestion, sinus pressure and sore throat. Negative for rhinorrhea, sinus pain and tinnitus.    Eyes:  Negative for discharge, redness and visual disturbance.   Respiratory:  Positive for cough. Negative for chest tightness, shortness of breath and wheezing.    Cardiovascular:  Negative for chest pain, palpitations and leg swelling.   Gastrointestinal:  Negative for abdominal pain, constipation, diarrhea, nausea and vomiting.   Endocrine: Negative for cold intolerance and heat intolerance.   Genitourinary:  Negative for flank pain, frequency and urgency.   Musculoskeletal:  Negative for back pain, gait problem and neck pain.   Skin:  Negative for rash and wound.   Neurological:  Positive for headaches. Negative for dizziness, tremors, syncope and numbness.   Hematological:  Does not bruise/bleed easily.   Psychiatric/Behavioral:  Negative for confusion, sleep disturbance and suicidal ideas.        Past Medical History:   Diagnosis Date    Encounter for gynecological examination (general) (routine) without abnormal findings     Pap test, as  part of routine gynecological examination    Encounter for screening for malignant neoplasm of colon 2021    COLOGUARD NEG    Immunization not carried out because of patient refusal     Influenza vaccination declined    Personal history of other diseases of the musculoskeletal system and connective tissue     History of low back pain    Personal history of other diseases of the musculoskeletal system and connective tissue     History of muscle spasm    Personal history of other medical treatment     H/O bone density study    Personal history of other medical treatment     H/O mammogram    Personal history of other specified conditions     History of palpitations       Past Surgical History:   Procedure Laterality Date     SECTION, LOW TRANSVERSE      OTHER SURGICAL HISTORY  2021    Epidural steroid injection    OTHER SURGICAL HISTORY  2021    Facet block       Family History   Problem Relation Name Age of Onset    Heart disease Mother      Arthritis Mother      Diabetes type II Mother      Hearing loss Father      Heart disease Father      Other (heart problem) Father      Diabetes type II Sister      Parkinsonism Brother         Social History     Tobacco Use    Smoking status: Never    Smokeless tobacco: Never   Vaping Use    Vaping status: Never Used   Substance Use Topics    Alcohol use: Yes     Comment: RARE OCCASION    Drug use: Never       Allergies   Allergen Reactions    Meloxicam Rash       Current Outpatient Medications   Medication Sig Dispense Refill    azithromycin (Zithromax) 250 mg tablet Take 2 tablets (500 mg) by mouth once daily for 1 day, THEN 1 tablet (250 mg) once daily for 4 days. Take 2 tabs (500 mg) by mouth today, than 1 daily for 4 days.. 6 tablet 0    calcium carbonate-vit D3-min 600 mg calcium- 400 unit tablet Take 1 tablet by mouth once daily.      cyanocobalamin (Vitamin B-12) 1,000 mcg tablet Take 1 tablet (1,000 mcg) by mouth once daily. 90 tablet 3     lisinopril 10 mg tablet TAKE 1 TABLET BY MOUTH EVERY DAY 90 tablet 2    multivitamin tablet Take 1 tablet by mouth once daily.      predniSONE (Deltasone) 10 mg tablet Take 3 tablets (30 mg) by mouth once daily. 15 tablet 0     No current facility-administered medications for this visit.       Objective   There were no vitals taken for this visit.    Physical Exam  Vitals reviewed.   Constitutional:       Appearance: Normal appearance. She is obese.   HENT:      Head: Normocephalic.   Eyes:      Conjunctiva/sclera: Conjunctivae normal.   Musculoskeletal:         General: Normal range of motion.   Neurological:      General: No focal deficit present.      Mental Status: She is alert and oriented to person, place, and time.   Psychiatric:         Mood and Affect: Mood normal.         Behavior: Behavior normal.     Testing   Suggest we hold off on repeat steroid given she just completed a strong dose but will monitor and consider pending response int he next few days     Prior glucose was borderline but approp if repeat steroid is needed     Impression    MDM    1) COMPLEXITY: 1 UNDIAGNOSED NEW PROBLEM WITH UNCERTAIN PROGNOSIS  2)DATA: TESTS INTERPRETED AND OR ORDERED, TOOK INDEPENDENT HISTORY OR RECORDS REVIEWED  3)RISK: MODERATE RISK DUE TO NATURE OF MEDICAL CONDITIONS/COMORBIDITY OR MEDICATIONS ORDERED OR SURGICAL OR PROCEDURE REFERRAL, .       Reviewed labs and Testing on file   Patient to follow diet low in cholesterol, fat, and sodium.    Patient is advised to increase Exercise.  Patient is recommended to lose weight.  Reviewed Meds and discussed common side effects  Continue as directed   Patient is strongly advised to be compliant with recommendations.    Return to Clinic sooner if needed.  Patient denies further questions/concerns at this time     Assessment/Plan   Problem List Items Addressed This Visit             ICD-10-CM    Glucose intolerance (impaired glucose tolerance) R73.02    Class 2 severe obesity  due to excess calories with serious comorbidity and body mass index (BMI) of 37.0 to 37.9 in adult E66.812, E66.01, Z68.37     Other Visit Diagnoses         Codes    Acute non-recurrent maxillary sinusitis    -  Primary J01.00    Relevant Medications    amoxicillin-pot clavulanate (Augmentin) 875-125 mg tablet    fluconazole (Diflucan) 150 mg tablet    Acute cough     R05.1    Relevant Medications    promethazine-DM (Phenergan-DM) 6.25-15 mg/5 mL syrup    Other Relevant Orders    XR chest 2 views          Pt to call in 1 week for illness check if needed    FU as before

## 2025-04-03 ENCOUNTER — TELEMEDICINE (OUTPATIENT)
Dept: PRIMARY CARE | Facility: CLINIC | Age: 60
End: 2025-04-03
Payer: COMMERCIAL

## 2025-04-03 VITALS — HEIGHT: 66 IN | WEIGHT: 230 LBS | BODY MASS INDEX: 36.96 KG/M2

## 2025-04-03 DIAGNOSIS — R05.1 ACUTE COUGH: ICD-10-CM

## 2025-04-03 DIAGNOSIS — R06.2 WHEEZING: ICD-10-CM

## 2025-04-03 DIAGNOSIS — J01.90 ACUTE NON-RECURRENT SINUSITIS, UNSPECIFIED LOCATION: ICD-10-CM

## 2025-04-03 DIAGNOSIS — R09.89 CHEST CONGESTION: Primary | ICD-10-CM

## 2025-04-03 PROCEDURE — 1036F TOBACCO NON-USER: CPT | Performed by: INTERNAL MEDICINE

## 2025-04-03 PROCEDURE — 3008F BODY MASS INDEX DOCD: CPT | Performed by: INTERNAL MEDICINE

## 2025-04-03 PROCEDURE — 99214 OFFICE O/P EST MOD 30 MIN: CPT | Performed by: INTERNAL MEDICINE

## 2025-04-03 RX ORDER — BENZONATATE 200 MG/1
200 CAPSULE ORAL 3 TIMES DAILY PRN
Qty: 21 CAPSULE | Refills: 0 | Status: SHIPPED | OUTPATIENT
Start: 2025-04-03 | End: 2025-04-10

## 2025-04-03 RX ORDER — PREDNISONE 5 MG/1
5 TABLET ORAL DAILY
Qty: 7 TABLET | Refills: 0 | Status: SHIPPED | OUTPATIENT
Start: 2025-04-03 | End: 2025-04-10

## 2025-04-03 RX ORDER — AZITHROMYCIN 250 MG/1
TABLET, FILM COATED ORAL
Qty: 6 TABLET | Refills: 0 | Status: SHIPPED | OUTPATIENT
Start: 2025-04-03 | End: 2025-04-08

## 2025-04-03 ASSESSMENT — ENCOUNTER SYMPTOMS
PSYCHIATRIC NEGATIVE: 1
SHORTNESS OF BREATH: 0
WEIGHT LOSS: 0
SORE THROAT: 1
FEVER: 0
BACK PAIN: 0
WHEEZING: 1
LIGHT-HEADEDNESS: 0
MYALGIAS: 0
DYSURIA: 0
ABDOMINAL PAIN: 0
CHILLS: 0
COUGH: 1
CARDIOVASCULAR NEGATIVE: 1
SWEATS: 0
DIZZINESS: 0
MUSCULOSKELETAL NEGATIVE: 1
AGITATION: 0
NAUSEA: 0
HEARTBURN: 0
CONSTIPATION: 0
ABDOMINAL DISTENTION: 0
WEAKNESS: 0
ENDOCRINE NEGATIVE: 1
PALPITATIONS: 0
EYES NEGATIVE: 1
VOMITING: 0
RHINORRHEA: 1
HEADACHES: 1
SORE THROAT: 0
DIARRHEA: 0
GASTROINTESTINAL NEGATIVE: 1
HEMOPTYSIS: 0

## 2025-04-03 NOTE — PROGRESS NOTES
Subjective   Patient ID: Maureen Giles is a 59 y.o. female who presents for Illness (Cough,fatigue 2 weeks drainage sore throat ).  Cough  This is a recurrent problem. The current episode started 1 to 4 weeks ago. The problem has been unchanged. The problem occurs every few minutes. The cough is Productive of sputum. Associated symptoms include ear congestion, headaches, nasal congestion, rhinorrhea, a sore throat and wheezing. Pertinent negatives include no chest pain, chills, ear pain, fever, heartburn, hemoptysis, myalgias, postnasal drip, rash, shortness of breath, sweats or weight loss.     VIRTUAL VISIT  PRODUCTIVE COUGH WITH FATIGUE AND SORE THROAT / SINUS AND CHEST CONGESTION X 2 WEEKS . OTC TX DIDN'T HELP MUCH . DIDN'T DO HOME COVID TEST. WHEEZING ON AND OFF . HAS PET (DOG ) AT HOME.  Review of Systems   Constitutional:  Negative for chills, fever and weight loss.   HENT:  Positive for congestion, rhinorrhea and sore throat. Negative for ear pain and postnasal drip.    Eyes: Negative.  Negative for visual disturbance.   Respiratory:  Positive for cough and wheezing. Negative for hemoptysis and shortness of breath.         CHEST CONGESTION   Cardiovascular: Negative.  Negative for chest pain, palpitations and leg swelling.   Gastrointestinal: Negative.  Negative for abdominal distention, abdominal pain, constipation, diarrhea, heartburn, nausea and vomiting.   Endocrine: Negative.    Genitourinary:  Negative for dysuria and urgency.   Musculoskeletal: Negative.  Negative for back pain and myalgias.   Skin: Negative.  Negative for rash.   Allergic/Immunologic: Negative for immunocompromised state.   Neurological:  Positive for headaches. Negative for dizziness, weakness and light-headedness.   Psychiatric/Behavioral: Negative.  Negative for agitation.        Objective   Physical Exam  Constitutional:       General: She is not in acute distress.     Appearance: She is not ill-appearing, toxic-appearing or  diaphoretic.   Neurological:      Mental Status: She is alert.         Assessment/Plan   1. Chest congestion  azithromycin (Zithromax) 250 mg tablet    XR chest 2 views      2. Acute cough  azithromycin (Zithromax) 250 mg tablet    predniSONE (Deltasone) 5 mg tablet    benzonatate (Tessalon) 200 mg capsule    XR chest 2 views      3. Acute non-recurrent sinusitis, unspecified location  azithromycin (Zithromax) 250 mg tablet      4. Wheezing          ADVISED TO ELEVATE THE HEAD OF THE BED FOR SLEEP AND TO SLEEP WITH WARM HUMIDIFIER.  PT WAS INSTRUCTED TO INCREASE FLUID INTAKE ,TAKE TYLENOL 650 MG PO Q6H/PRN FOR PAIN OR FEVER AND TAKE ROBITUSSIN OTC 2 TSP Q 6H/PRN FOR COUGH.  ADVISED TO Gargle with WARM SALTWATER.  EXPLAINED THE POSSIBILITY OF NEW ONSET ASTHMA WITH HAVING PET AT HOME AND DIFFICULTY TO GET OVER THIS INFECTION. WILL DO MORE LUNG EVALUATIONS IF SYMPTOMS PERSISTS. ADVISED FOR CXR.    MDM    1) COMPLEXITY: 1 UNDIAGNOSED NEW PROBLEM WITH UNCERTAIN PROGNOSIS  2)DATA: TESTS INTERPRETED AND OR ORDERED, TOOK INDEPENDENT HISTORY OR RECORDS REVIEWED  3)RISK: MODERATE RISK DUE TO NATURE OF MEDICAL CONDITIONS/COMORBIDITY OR MEDICATIONS ORDERED OR SURGICAL OR PROCEDURE REFERRAL, .         2 weeks in person

## 2025-04-17 ENCOUNTER — APPOINTMENT (OUTPATIENT)
Dept: PRIMARY CARE | Facility: CLINIC | Age: 60
End: 2025-04-17
Payer: COMMERCIAL

## 2025-05-07 ENCOUNTER — TELEMEDICINE (OUTPATIENT)
Dept: PRIMARY CARE | Facility: CLINIC | Age: 60
End: 2025-05-07
Payer: COMMERCIAL

## 2025-05-07 VITALS — BODY MASS INDEX: 36.96 KG/M2 | WEIGHT: 230 LBS | HEIGHT: 66 IN

## 2025-05-07 DIAGNOSIS — E66.01 CLASS 2 SEVERE OBESITY DUE TO EXCESS CALORIES WITH SERIOUS COMORBIDITY AND BODY MASS INDEX (BMI) OF 37.0 TO 37.9 IN ADULT: ICD-10-CM

## 2025-05-07 DIAGNOSIS — E66.812 CLASS 2 SEVERE OBESITY DUE TO EXCESS CALORIES WITH SERIOUS COMORBIDITY AND BODY MASS INDEX (BMI) OF 37.0 TO 37.9 IN ADULT: ICD-10-CM

## 2025-05-07 DIAGNOSIS — I10 BENIGN ESSENTIAL HYPERTENSION: ICD-10-CM

## 2025-05-07 DIAGNOSIS — J30.1 SEASONAL ALLERGIC RHINITIS DUE TO POLLEN: ICD-10-CM

## 2025-05-07 DIAGNOSIS — J01.00 ACUTE NON-RECURRENT MAXILLARY SINUSITIS: Primary | ICD-10-CM

## 2025-05-07 PROCEDURE — 99214 OFFICE O/P EST MOD 30 MIN: CPT | Performed by: PHYSICIAN ASSISTANT

## 2025-05-07 PROCEDURE — 3008F BODY MASS INDEX DOCD: CPT | Performed by: PHYSICIAN ASSISTANT

## 2025-05-07 PROCEDURE — 1036F TOBACCO NON-USER: CPT | Performed by: PHYSICIAN ASSISTANT

## 2025-05-07 RX ORDER — FLUCONAZOLE 150 MG/1
150 TABLET ORAL
Qty: 3 TABLET | Refills: 0 | Status: SHIPPED | OUTPATIENT
Start: 2025-05-07

## 2025-05-07 RX ORDER — AMOXICILLIN AND CLAVULANATE POTASSIUM 875; 125 MG/1; MG/1
875 TABLET, FILM COATED ORAL 2 TIMES DAILY
Qty: 20 TABLET | Refills: 0 | Status: SHIPPED | OUTPATIENT
Start: 2025-05-07 | End: 2025-05-17

## 2025-05-07 ASSESSMENT — ENCOUNTER SYMPTOMS
RHINORRHEA: 0
FATIGUE: 1
SINUS PRESSURE: 1
CONFUSION: 0
SLEEP DISTURBANCE: 0
COUGH: 1
CONSTIPATION: 0
FLANK PAIN: 0
BACK PAIN: 0
FEVER: 0
DIARRHEA: 0
EYE DISCHARGE: 0
BRUISES/BLEEDS EASILY: 0
VOMITING: 0
NECK PAIN: 0
FREQUENCY: 0
EYE REDNESS: 0
CHILLS: 0
SHORTNESS OF BREATH: 0
HEADACHES: 1
DIZZINESS: 0
WHEEZING: 0
NAUSEA: 0
TREMORS: 0
SORE THROAT: 1
SINUS PAIN: 0
WOUND: 0
NUMBNESS: 0
PALPITATIONS: 0
CHEST TIGHTNESS: 0
ABDOMINAL PAIN: 0

## 2025-05-07 ASSESSMENT — PATIENT HEALTH QUESTIONNAIRE - PHQ9
1. LITTLE INTEREST OR PLEASURE IN DOING THINGS: NOT AT ALL
2. FEELING DOWN, DEPRESSED OR HOPELESS: NOT AT ALL
SUM OF ALL RESPONSES TO PHQ9 QUESTIONS 1 AND 2: 0

## 2025-05-12 ENCOUNTER — TELEPHONE (OUTPATIENT)
Dept: PRIMARY CARE | Facility: CLINIC | Age: 60
End: 2025-05-12
Payer: COMMERCIAL

## 2025-05-12 DIAGNOSIS — J01.00 ACUTE NON-RECURRENT MAXILLARY SINUSITIS: Primary | ICD-10-CM

## 2025-05-12 RX ORDER — METHYLPREDNISOLONE 4 MG/1
TABLET ORAL
Qty: 21 TABLET | Refills: 0 | Status: SHIPPED | OUTPATIENT
Start: 2025-05-12 | End: 2025-05-18

## 2025-05-12 NOTE — TELEPHONE ENCOUNTER
PATIENT CALLED AND LEFT A MESSAGE ON OUR [employer]. SHE HAD APPOINTMENT WITH YOU LAST WEEK FOR ILLNESS AND WAS PRESCRIBED ABX, WHICH SHE IS STILL TAKING. SHE HAS NOTICED SOME IMPROVEMENT, BUT NOT MUCH, AND NOW HAS LEFT EAR PAIN. SHE QUESTIONS IF THERE IS SOMETHING YOU CAN PRESCRIBE FOR THE EAR PAIN. PLEASE ADVISE.   CURRENTLY AT WORK BUT CAN LEAVE MESSAGE ON VOICEMAIL.

## 2025-05-13 NOTE — TELEPHONE ENCOUNTER
Medrol dose pack sent to the pharm   Suggest OTC flonase as well     If little change she needs to be seen in office for re-eval     Thanks

## 2025-06-02 ENCOUNTER — OFFICE VISIT (OUTPATIENT)
Dept: PRIMARY CARE | Facility: CLINIC | Age: 60
End: 2025-06-02
Payer: COMMERCIAL

## 2025-06-02 VITALS
SYSTOLIC BLOOD PRESSURE: 138 MMHG | HEART RATE: 97 BPM | BODY MASS INDEX: 37.45 KG/M2 | WEIGHT: 233 LBS | DIASTOLIC BLOOD PRESSURE: 79 MMHG | HEIGHT: 66 IN

## 2025-06-02 DIAGNOSIS — I88.9 LYMPHADENITIS: ICD-10-CM

## 2025-06-02 DIAGNOSIS — J06.9 UPPER RESPIRATORY TRACT INFECTION, UNSPECIFIED TYPE: ICD-10-CM

## 2025-06-02 DIAGNOSIS — J04.0 LARYNGITIS: Primary | ICD-10-CM

## 2025-06-02 PROCEDURE — 99213 OFFICE O/P EST LOW 20 MIN: CPT | Performed by: INTERNAL MEDICINE

## 2025-06-02 PROCEDURE — 3008F BODY MASS INDEX DOCD: CPT | Performed by: INTERNAL MEDICINE

## 2025-06-02 PROCEDURE — 1036F TOBACCO NON-USER: CPT | Performed by: INTERNAL MEDICINE

## 2025-06-02 PROCEDURE — 3075F SYST BP GE 130 - 139MM HG: CPT | Performed by: INTERNAL MEDICINE

## 2025-06-02 PROCEDURE — 3078F DIAST BP <80 MM HG: CPT | Performed by: INTERNAL MEDICINE

## 2025-06-02 RX ORDER — PREDNISONE 10 MG/1
10 TABLET ORAL 3 TIMES DAILY
Qty: 15 TABLET | Refills: 0 | Status: SHIPPED | OUTPATIENT
Start: 2025-06-02 | End: 2025-06-07

## 2025-06-02 RX ORDER — AZITHROMYCIN 250 MG/1
TABLET, FILM COATED ORAL
Qty: 6 TABLET | Refills: 0 | Status: SHIPPED | OUTPATIENT
Start: 2025-06-02 | End: 2025-06-07

## 2025-06-02 ASSESSMENT — ENCOUNTER SYMPTOMS
HEMATOLOGIC/LYMPHATIC NEGATIVE: 1
PSYCHIATRIC NEGATIVE: 1
DIARRHEA: 0
CONFUSION: 0
NAUSEA: 0
VOMITING: 0
JOINT SWELLING: 0
BACK PAIN: 0
EYES NEGATIVE: 1
ENDOCRINE NEGATIVE: 1
GASTROINTESTINAL NEGATIVE: 1
COUGH: 1
EYE DISCHARGE: 0
CONSTIPATION: 0
CHILLS: 1
CARDIOVASCULAR NEGATIVE: 1
NEUROLOGICAL NEGATIVE: 1
MUSCULOSKELETAL NEGATIVE: 1
ALLERGIC/IMMUNOLOGIC NEGATIVE: 1
ABDOMINAL DISTENTION: 0
HEADACHES: 0
NECK STIFFNESS: 0
NUMBNESS: 0
SHORTNESS OF BREATH: 0
PALPITATIONS: 0
WHEEZING: 0
FEVER: 0
ABDOMINAL PAIN: 0
LIGHT-HEADEDNESS: 0
SORE THROAT: 1
AGITATION: 0
ADENOPATHY: 0
DYSURIA: 0

## 2025-06-02 NOTE — PROGRESS NOTES
Subjective   Patient ID: Maureen Giles is a 60 y.o. female who presents for Follow-up (Saw tenzin for sinus infection over 1 wk and now losing her voice and still has cough and sinus drainage).  Cough sore throat hoarseness of voice x 3 days        Review of Systems   Constitutional:  Positive for chills. Negative for fever.   HENT:  Positive for congestion and sore throat.    Eyes: Negative.  Negative for discharge.   Respiratory:  Positive for cough. Negative for shortness of breath and wheezing.    Cardiovascular: Negative.  Negative for chest pain, palpitations and leg swelling.   Gastrointestinal: Negative.  Negative for abdominal distention, abdominal pain, constipation, diarrhea, nausea and vomiting.   Endocrine: Negative.    Genitourinary: Negative.  Negative for dysuria and urgency.   Musculoskeletal: Negative.  Negative for back pain, joint swelling and neck stiffness.   Skin: Negative.  Negative for rash.   Allergic/Immunologic: Negative.  Negative for immunocompromised state.   Neurological: Negative.  Negative for light-headedness, numbness and headaches.   Hematological: Negative.  Negative for adenopathy.   Psychiatric/Behavioral: Negative.  Negative for agitation, behavioral problems and confusion.    All other systems reviewed and are negative.      Objective   Physical Exam  Vitals reviewed.   Constitutional:       General: She is not in acute distress.     Appearance: Normal appearance.   HENT:      Head: Normocephalic and atraumatic.      Nose: Congestion and rhinorrhea present.      Mouth/Throat:      Pharynx: Posterior oropharyngeal erythema present.      Comments: Horesness voice  Eyes:      Conjunctiva/sclera: Conjunctivae normal.      Pupils: Pupils are equal, round, and reactive to light.   Neck:      Vascular: No carotid bruit.   Cardiovascular:      Rate and Rhythm: Normal rate and regular rhythm.      Pulses: Normal pulses.      Heart sounds:      No gallop.   Pulmonary:      Effort:  "Pulmonary effort is normal. No respiratory distress.      Breath sounds: Normal breath sounds. No wheezing.   Abdominal:      General: Bowel sounds are normal.      Palpations: Abdomen is soft.      Tenderness: There is no abdominal tenderness.   Musculoskeletal:         General: Normal range of motion.      Cervical back: Normal range of motion. Tenderness (lymph nodes) present. No rigidity.   Lymphadenopathy:      Cervical: No cervical adenopathy.   Skin:     General: Skin is warm.      Findings: No rash.   Neurological:      General: No focal deficit present.      Mental Status: She is alert and oriented to person, place, and time.   Psychiatric:         Mood and Affect: Mood normal.         Behavior: Behavior normal.       /79 (BP Location: Right arm, Patient Position: Sitting)   Pulse 97   Ht 1.676 m (5' 6\")   Wt 106 kg (233 lb)   BMI 37.61 kg/m²    Hemoglobin A1C   Date/Time Value Ref Range Status   11/16/2024 08:33 AM 6.0 (H) See comment % Final     Assessment/Plan   Problem List Items Addressed This Visit    None  Visit Diagnoses         Laryngitis    -  Primary    Relevant Medications    predniSONE (Deltasone) 10 mg tablet      Upper respiratory tract infection, unspecified type        Relevant Medications    azithromycin (Zithromax) 250 mg tablet      Lymphadenitis        Relevant Medications    azithromycin (Zithromax) 250 mg tablet             PT. WAS INSTRUCTED TO INCREASE FLUID INTAKE ,TAKE TYLENOL 650 MG PO Q6H/PRN FOR PAIN OR FEVER AND TAKE ROBITUSSIN OTC 2 TSP Q 6H/PRN FOR COUGH.      Fu As befor  "

## 2025-06-04 ENCOUNTER — TELEPHONE (OUTPATIENT)
Dept: PRIMARY CARE | Facility: CLINIC | Age: 60
End: 2025-06-04
Payer: COMMERCIAL

## 2025-06-04 DIAGNOSIS — J04.0 LARYNGITIS: Primary | ICD-10-CM

## 2025-06-04 NOTE — TELEPHONE ENCOUNTER
PATIENT CALLED SAID MEDS AREN'T WORKING SHE STILL CAN'T TALK AND WOULD LIKE ADVISED WHERE TO GO FROM HERE SHE SAID YOU TOLD HER THAT HER VOICE SHOULD START CLEARING UP WITHIN 48 HOURS

## 2025-06-09 ENCOUNTER — HOSPITAL ENCOUNTER (OUTPATIENT)
Dept: RADIOLOGY | Facility: HOSPITAL | Age: 60
Discharge: HOME | End: 2025-06-09
Payer: COMMERCIAL

## 2025-06-09 ENCOUNTER — APPOINTMENT (OUTPATIENT)
Dept: PRIMARY CARE | Facility: CLINIC | Age: 60
End: 2025-06-09
Payer: COMMERCIAL

## 2025-06-09 VITALS
BODY MASS INDEX: 37.57 KG/M2 | SYSTOLIC BLOOD PRESSURE: 140 MMHG | WEIGHT: 233.8 LBS | OXYGEN SATURATION: 94 % | HEIGHT: 66 IN | HEART RATE: 109 BPM | DIASTOLIC BLOOD PRESSURE: 91 MMHG

## 2025-06-09 DIAGNOSIS — R42 LIGHT-HEADED FEELING: ICD-10-CM

## 2025-06-09 DIAGNOSIS — K21.9 GASTROESOPHAGEAL REFLUX DISEASE, UNSPECIFIED WHETHER ESOPHAGITIS PRESENT: ICD-10-CM

## 2025-06-09 DIAGNOSIS — J30.1 SEASONAL ALLERGIC RHINITIS DUE TO POLLEN: ICD-10-CM

## 2025-06-09 DIAGNOSIS — J04.0 LARYNGITIS: Primary | ICD-10-CM

## 2025-06-09 DIAGNOSIS — R09.89 CHEST CONGESTION: ICD-10-CM

## 2025-06-09 DIAGNOSIS — E66.812 CLASS 2 SEVERE OBESITY DUE TO EXCESS CALORIES WITH SERIOUS COMORBIDITY AND BODY MASS INDEX (BMI) OF 37.0 TO 37.9 IN ADULT: ICD-10-CM

## 2025-06-09 DIAGNOSIS — I10 BENIGN ESSENTIAL HYPERTENSION: ICD-10-CM

## 2025-06-09 DIAGNOSIS — R05.1 ACUTE COUGH: ICD-10-CM

## 2025-06-09 DIAGNOSIS — R73.02 GLUCOSE INTOLERANCE (IMPAIRED GLUCOSE TOLERANCE): ICD-10-CM

## 2025-06-09 DIAGNOSIS — E66.01 CLASS 2 SEVERE OBESITY DUE TO EXCESS CALORIES WITH SERIOUS COMORBIDITY AND BODY MASS INDEX (BMI) OF 37.0 TO 37.9 IN ADULT: ICD-10-CM

## 2025-06-09 PROCEDURE — 71046 X-RAY EXAM CHEST 2 VIEWS: CPT

## 2025-06-09 PROCEDURE — 3077F SYST BP >= 140 MM HG: CPT | Performed by: PHYSICIAN ASSISTANT

## 2025-06-09 PROCEDURE — 3080F DIAST BP >= 90 MM HG: CPT | Performed by: PHYSICIAN ASSISTANT

## 2025-06-09 PROCEDURE — 3008F BODY MASS INDEX DOCD: CPT | Performed by: PHYSICIAN ASSISTANT

## 2025-06-09 PROCEDURE — 99214 OFFICE O/P EST MOD 30 MIN: CPT | Performed by: PHYSICIAN ASSISTANT

## 2025-06-09 PROCEDURE — 1036F TOBACCO NON-USER: CPT | Performed by: PHYSICIAN ASSISTANT

## 2025-06-09 PROCEDURE — 71046 X-RAY EXAM CHEST 2 VIEWS: CPT | Performed by: RADIOLOGY

## 2025-06-09 RX ORDER — MONTELUKAST SODIUM 10 MG/1
1 TABLET ORAL
COMMUNITY
Start: 2025-06-06

## 2025-06-09 ASSESSMENT — ENCOUNTER SYMPTOMS
LIGHT-HEADEDNESS: 1
FATIGUE: 1
EYE REDNESS: 0
DIARRHEA: 0
DIZZINESS: 1
SINUS PAIN: 0
WEAKNESS: 1
FREQUENCY: 0
HEADACHES: 0
RHINORRHEA: 0
COUGH: 1
NECK PAIN: 0
WHEEZING: 0
SORE THROAT: 1
ABDOMINAL PAIN: 0
VOICE CHANGE: 1
NAUSEA: 0
SHORTNESS OF BREATH: 0
CONFUSION: 0
BACK PAIN: 0
CONSTIPATION: 0
CHILLS: 0
SLEEP DISTURBANCE: 0
FLANK PAIN: 0
BRUISES/BLEEDS EASILY: 0
WOUND: 0
FEVER: 0
TREMORS: 0
CHEST TIGHTNESS: 0
PALPITATIONS: 0
EYE DISCHARGE: 0
VOMITING: 0
NUMBNESS: 0

## 2025-06-09 NOTE — PROGRESS NOTES
Subjective   Patient ID: Maureen Giles is a 60 y.o. female who presents for Follow-up (1 WEEK F/U ILLNESS. VOICE HAS IMPROVED BUT STILL HAVING SYMPTOMS, SAW ENT FRIDAY PRESCRIBED LEVAQUIN AND SINGULAIR, PT HAS HAD SE SINCE STARTING MEDS)    HPI    Illness symptoms x 5 weeks   Pt states she was on a cruise so was exposed to many things   Home COVID - NEG   She has been treating with OTC dayquil, nyquild and sudafed and mucinex but little relief   Cough +production, gen head congestion, ears pluged, PND  Denies fever, N/V/D   She has noted symptoms beginning of Jan given zpack with no change so folowing week given augumentin which resolved symptoms for about 3 mo but then symptoms flared up again in April and was given zpack with some relief prior to the cruise but symptoms now exacerbated again 1 mo later.    May 7 Augmentin was given but symptoms persists   May 13th she was given medrol dose pack and flonase   June 2 she was given prednisone and zpack - (MMT)  June 5 - referred to ENT - unfort I do not have the note.  She did have a scope done and was started on leavquin and singulair.  She notes since starting these meds she feels worse - fatigue, off feeling, loopy.  We discussed stopping both meds and expect side effect to stop within a few days then retry levaquin only.  Call if symptoms do not resolve or re-appear again after restarting.  I am hoping s.e. is to the singulair but will do trial to see.  Consider side effect is to the prednisone courses she has taken as well.  Pt to call to give update with how she is feeling this week/next.  I would like her to have CXR done as well   No follow up scheduled with ENT but consider keeping them posted as well   We discussed consider need for further pulm work up pending her response to meds and symptoms     med check   HTN - lisinopril - monitor home readings and discussed if BP >130/>80 consistently consider need to inc meds   Pain - f  wt loss - she is  "following with a clinic and is on mounjouro has since been off med but done well maintaining wt loss but questions if she can re-start.  We discussed velasquezquin or saxenda - not covered or she can reach out to the clinic again   foot pain - plantar fascitis/heel spur - cont to suffer at times. using NSAID only prn- following with pod and in therapy       Preventative testing   mammo - Dec 2023-   DEXa- Dec 2023 - WNL  pap - 2014 -- pt declines at this time  COLONOSCOPY - Cologuard dec 2021- NEG   Fall -NEG JUNE 2025   PHQ2 NEG JUNE 2025        Problem List[1]    Review of Systems   Constitutional:  Positive for fatigue. Negative for chills and fever.   HENT:  Positive for sore throat and voice change. Negative for congestion, rhinorrhea, sinus pain and tinnitus.    Eyes:  Negative for discharge, redness and visual disturbance.   Respiratory:  Positive for cough. Negative for chest tightness, shortness of breath and wheezing.    Cardiovascular:  Negative for chest pain, palpitations and leg swelling.   Gastrointestinal:  Negative for abdominal pain, constipation, diarrhea, nausea and vomiting.   Endocrine: Negative for cold intolerance and heat intolerance.   Genitourinary:  Negative for flank pain, frequency and urgency.   Musculoskeletal:  Negative for back pain, gait problem and neck pain.   Skin:  Negative for rash and wound.   Neurological:  Positive for dizziness, weakness and light-headedness. Negative for tremors, syncope, numbness and headaches.   Hematological:  Does not bruise/bleed easily.   Psychiatric/Behavioral:  Negative for confusion, sleep disturbance and suicidal ideas.        Medical History[2]    Surgical History[3]    Family History[4]    Social History[5]    Allergies[6]    Current Medications[7]    Objective   BP (!) 140/91   Pulse 109   Ht 1.676 m (5' 6\")   Wt 106 kg (233 lb 12.8 oz)   SpO2 94% Comment: ROOM AIR AT REST  BMI 37.74 kg/m²     Physical Exam  Vitals reviewed.   Constitutional:  "      Appearance: Normal appearance. She is obese.   HENT:      Head: Normocephalic.      Right Ear: External ear normal.      Left Ear: External ear normal.      Nose: Nose normal. No congestion or rhinorrhea.      Mouth/Throat:      Mouth: Mucous membranes are moist.   Eyes:      Extraocular Movements: Extraocular movements intact.      Conjunctiva/sclera: Conjunctivae normal.      Pupils: Pupils are equal, round, and reactive to light.   Cardiovascular:      Rate and Rhythm: Normal rate and regular rhythm.      Pulses: Normal pulses.   Pulmonary:      Effort: Pulmonary effort is normal.      Breath sounds: Normal breath sounds.   Abdominal:      General: Bowel sounds are normal.      Palpations: Abdomen is soft.      Tenderness: There is no abdominal tenderness. There is no right CVA tenderness or left CVA tenderness.   Musculoskeletal:         General: No tenderness. Normal range of motion.      Cervical back: Normal range of motion and neck supple. No tenderness.   Skin:     General: Skin is warm and dry.   Neurological:      General: No focal deficit present.      Mental Status: She is alert and oriented to person, place, and time.   Psychiatric:         Mood and Affect: Mood normal.         Behavior: Behavior normal.         Testing   CXR ordered - to have done   Will see if we can get copy of ENT note     Impression    MDM    1) COMPLEXITY: 1 UNDIAGNOSED NEW PROBLEM WITH UNCERTAIN PROGNOSIS  2)DATA: TESTS INTERPRETED AND OR ORDERED, TOOK INDEPENDENT HISTORY OR RECORDS REVIEWED  3)RISK: MODERATE RISK DUE TO NATURE OF MEDICAL CONDITIONS/COMORBIDITY OR MEDICATIONS ORDERED OR SURGICAL OR PROCEDURE REFERRAL, .     Reviewed labs and Testing on file   Patient to follow diet low in cholesterol, fat, and sodium.    Patient is advised to increase Exercise.  Patient is recommended to lose weight.  Reviewed Meds and discussed common side effects  Continue as directed   Patient is strongly advised to be compliant with  recommendations.    Return to Clinic sooner if needed.  Patient denies further questions/concerns at this time     Assessment/Plan   Problem List Items Addressed This Visit           ICD-10-CM    Allergic rhinitis J30.9    Benign essential hypertension I10    GERD (gastroesophageal reflux disease) K21.9    Glucose intolerance (impaired glucose tolerance) R73.02    Class 2 severe obesity due to excess calories with serious comorbidity and body mass index (BMI) of 37.0 to 37.9 in adult E66.812, E66.01, Z68.37     Other Visit Diagnoses         Codes      Laryngitis    -  Primary J04.0      Light-headed feeling     R42             FU in 1 mo with chronic hoarse voice/resp check          [1]   Patient Active Problem List  Diagnosis    Allergic rhinitis    Benign essential hypertension    Bilateral carpal tunnel syndrome    Chronic low back pain with right-sided sciatica    GERD (gastroesophageal reflux disease)    Glucose intolerance (impaired glucose tolerance)    Hypercholesteremia    Insomnia    Lumbar arthropathy    Lumbar neuritis    Disc disease, degenerative, lumbar or lumbosacral    Right foot pain    Sacroiliitis    Sprain of left upper arm    Weight gain    Class 2 severe obesity due to excess calories with serious comorbidity and body mass index (BMI) of 37.0 to 37.9 in adult   [2]   Past Medical History:  Diagnosis Date    Encounter for gynecological examination (general) (routine) without abnormal findings     Pap test, as part of routine gynecological examination    Encounter for screening for malignant neoplasm of colon 12/08/2021    COLOGUARD NEG    Immunization not carried out because of patient refusal     Influenza vaccination declined    Personal history of other diseases of the musculoskeletal system and connective tissue     History of low back pain    Personal history of other diseases of the musculoskeletal system and connective tissue     History of muscle spasm    Personal history of other  medical treatment     H/O bone density study    Personal history of other medical treatment     H/O mammogram    Personal history of other specified conditions     History of palpitations   [3]   Past Surgical History:  Procedure Laterality Date     SECTION, LOW TRANSVERSE      OTHER SURGICAL HISTORY  2021    Epidural steroid injection    OTHER SURGICAL HISTORY  2021    Facet block   [4]   Family History  Problem Relation Name Age of Onset    Heart disease Mother      Arthritis Mother      Diabetes type II Mother      Hearing loss Father      Heart disease Father      Other (heart problem) Father      Diabetes type II Sister      Parkinsonism Brother     [5]   Social History  Tobacco Use    Smoking status: Never    Smokeless tobacco: Never   Vaping Use    Vaping status: Never Used   Substance Use Topics    Alcohol use: Yes     Comment: RARE OCCASION    Drug use: Never   [6]   Allergies  Allergen Reactions    Meloxicam Rash   [7]   Current Outpatient Medications   Medication Sig Dispense Refill    calcium carbonate-vit D3-min 600 mg calcium- 400 unit tablet Take 1 tablet by mouth once daily.      cyanocobalamin (Vitamin B-12) 1,000 mcg tablet Take 1 tablet (1,000 mcg) by mouth once daily. 90 tablet 3    fluconazole (Diflucan) 150 mg tablet Take 1 tablet (150 mg) by mouth every 3rd day. 3 tablet 0    lisinopril 10 mg tablet TAKE 1 TABLET BY MOUTH EVERY DAY 90 tablet 2    montelukast (Singulair) 10 mg tablet Take 1 tablet (10 mg) by mouth early in the morning..      multivitamin tablet Take 1 tablet by mouth once daily.       No current facility-administered medications for this visit.

## 2025-06-12 DIAGNOSIS — Z12.12 SCREENING FOR COLORECTAL CANCER: ICD-10-CM

## 2025-06-12 DIAGNOSIS — Z12.11 SCREENING FOR COLORECTAL CANCER: ICD-10-CM

## 2025-06-22 LAB — NONINV COLON CA DNA+OCC BLD SCRN STL QL: NEGATIVE

## 2025-07-17 ENCOUNTER — APPOINTMENT (OUTPATIENT)
Dept: PRIMARY CARE | Facility: CLINIC | Age: 60
End: 2025-07-17
Payer: COMMERCIAL

## 2025-07-17 VITALS
HEIGHT: 66 IN | HEART RATE: 79 BPM | BODY MASS INDEX: 37.54 KG/M2 | WEIGHT: 233.6 LBS | SYSTOLIC BLOOD PRESSURE: 132 MMHG | DIASTOLIC BLOOD PRESSURE: 76 MMHG

## 2025-07-17 DIAGNOSIS — J30.1 SEASONAL ALLERGIC RHINITIS DUE TO POLLEN: ICD-10-CM

## 2025-07-17 DIAGNOSIS — I10 BENIGN ESSENTIAL HYPERTENSION: ICD-10-CM

## 2025-07-17 DIAGNOSIS — E66.01 CLASS 2 SEVERE OBESITY DUE TO EXCESS CALORIES WITH SERIOUS COMORBIDITY AND BODY MASS INDEX (BMI) OF 37.0 TO 37.9 IN ADULT: ICD-10-CM

## 2025-07-17 DIAGNOSIS — J04.0 LARYNGITIS: Primary | ICD-10-CM

## 2025-07-17 DIAGNOSIS — E66.812 CLASS 2 SEVERE OBESITY DUE TO EXCESS CALORIES WITH SERIOUS COMORBIDITY AND BODY MASS INDEX (BMI) OF 37.0 TO 37.9 IN ADULT: ICD-10-CM

## 2025-07-17 PROCEDURE — 3008F BODY MASS INDEX DOCD: CPT | Performed by: PHYSICIAN ASSISTANT

## 2025-07-17 PROCEDURE — 3075F SYST BP GE 130 - 139MM HG: CPT | Performed by: PHYSICIAN ASSISTANT

## 2025-07-17 PROCEDURE — 3078F DIAST BP <80 MM HG: CPT | Performed by: PHYSICIAN ASSISTANT

## 2025-07-17 PROCEDURE — 1036F TOBACCO NON-USER: CPT | Performed by: PHYSICIAN ASSISTANT

## 2025-07-17 PROCEDURE — 99214 OFFICE O/P EST MOD 30 MIN: CPT | Performed by: PHYSICIAN ASSISTANT

## 2025-07-17 RX ORDER — LISINOPRIL 10 MG/1
10 TABLET ORAL DAILY
Qty: 90 TABLET | Refills: 3 | Status: SHIPPED | OUTPATIENT
Start: 2025-07-17

## 2025-07-17 ASSESSMENT — ENCOUNTER SYMPTOMS
SLEEP DISTURBANCE: 0
NAUSEA: 0
CONFUSION: 0
CONSTIPATION: 0
NUMBNESS: 0
FEVER: 0
EYE DISCHARGE: 0
SINUS PAIN: 0
EYE REDNESS: 0
PALPITATIONS: 0
HEADACHES: 0
RHINORRHEA: 0
DIZZINESS: 0
CHILLS: 0
SORE THROAT: 0
FLANK PAIN: 0
NECK PAIN: 0
TREMORS: 0
SHORTNESS OF BREATH: 0
WHEEZING: 0
BACK PAIN: 0
VOMITING: 0
COUGH: 0
FATIGUE: 0
BRUISES/BLEEDS EASILY: 0
DIARRHEA: 0
ABDOMINAL PAIN: 0
WOUND: 0
FREQUENCY: 0
CHEST TIGHTNESS: 0

## 2025-07-17 NOTE — PROGRESS NOTES
Subjective   Patient ID: Maureen Giles is a 60 y.o. female who presents for Follow-up (1 MONTH F/U)    HPI    Illness symptoms x 5 weeks   Pt states she was on a cruise so was exposed to many things   Home COVID - NEG   She has been treating with OTC dayquil, nyquild and sudafed and mucinex but little relief   Cough +production, gen head congestion, ears pluged, PND  Denies fever, N/V/D   She has noted symptoms beginning of Jan given zpack with no change so folowing week given augumentin which resolved symptoms for about 3 mo but then symptoms flared up again in April and was given zpack with some relief prior to the cruise but symptoms now exacerbated again 1 mo later.    May 7 Augmentin was given but symptoms persists   May 13th she was given medrol dose pack and flonase   June 2 she was given prednisone and zpack - (MMT)  June 5 - referred to ENT - unfort I do not have the note.  She did have a scope done and was started on leavquin and singulair.  She notes since starting these meds she feels worse - fatigue, off feeling, loopy.  We discussed stopping both meds and expect side effect to stop within a few days then retry levaquin only.  Call if symptoms do not resolve or re-appear again after restarting.  I am hoping s.e. is to the singulair but will do trial to see.  Consider side effect is to the prednisone courses she has taken as well.  Pt to call to give update with how she is feeling this week/next.  I would like her to have CXR done as well   No follow up scheduled with ENT but consider keeping them posted as well   We discussed consider need for further pulm work up pending her response to meds and symptoms   ____ she has since finished the levaquin and all symptoms resolved - doing well.  We discussed if she gets symptoms like this again we can consider levaquin sooner but if she is needing such a long duration of tx then consider need for additional pulm work up      med check   HTN - lisinopril -  "monitor home readings and discussed if BP >130/>80 consistently consider need to inc meds   wt loss - she is following with a clinic and is on mounjouro has since been off med but done well maintaining wt loss but questions if she can re-start.  We discussed dayan or saxenda - not covered or she can reach out to the clinic again   foot pain - plantar fascitis/heel spur - cont to suffer at times. using NSAID only prn- following with pod and in therapy       Preventative testing   mammo - Dec 2023- - agreeable to schedule end of the year   DEXa- Dec 2023 - WNL  pap - 2014 -- pt declines at this time  COLONOSCOPY - Cologuard dec 2021- NEG / cologuard June 2025 - NEG   Fall -NEG JUNE 2025   PHQ2 NEG JUNE 2025          Problem List[1]    Review of Systems   Constitutional:  Negative for chills, fatigue and fever.   HENT:  Negative for congestion, rhinorrhea, sinus pain, sore throat and tinnitus.    Eyes:  Negative for discharge, redness and visual disturbance.   Respiratory:  Negative for cough, chest tightness, shortness of breath and wheezing.    Cardiovascular:  Negative for chest pain, palpitations and leg swelling.   Gastrointestinal:  Negative for abdominal pain, constipation, diarrhea, nausea and vomiting.   Endocrine: Negative for cold intolerance and heat intolerance.   Genitourinary:  Negative for flank pain, frequency and urgency.   Musculoskeletal:  Negative for back pain, gait problem and neck pain.   Skin:  Negative for rash and wound.   Neurological:  Negative for dizziness, tremors, syncope, numbness and headaches.   Hematological:  Does not bruise/bleed easily.   Psychiatric/Behavioral:  Negative for confusion, sleep disturbance and suicidal ideas.        Medical History[2]    Surgical History[3]    Family History[4]    Social History[5]    Allergies[6]    Current Medications[7]    Objective   /76   Pulse 79   Ht 1.676 m (5' 6\")   Wt 106 kg (233 lb 9.6 oz)   BMI 37.70 kg/m²     Physical " Exam  Vitals reviewed.   Constitutional:       Appearance: Normal appearance. She is obese.   HENT:      Head: Normocephalic.      Right Ear: External ear normal.      Left Ear: External ear normal.      Nose: Nose normal. No congestion or rhinorrhea.      Mouth/Throat:      Mouth: Mucous membranes are moist.     Eyes:      Extraocular Movements: Extraocular movements intact.      Conjunctiva/sclera: Conjunctivae normal.      Pupils: Pupils are equal, round, and reactive to light.       Cardiovascular:      Rate and Rhythm: Normal rate and regular rhythm.      Pulses: Normal pulses.   Pulmonary:      Effort: Pulmonary effort is normal.      Breath sounds: Normal breath sounds.   Abdominal:      General: Bowel sounds are normal.      Palpations: Abdomen is soft.      Tenderness: There is no abdominal tenderness. There is no right CVA tenderness or left CVA tenderness.     Musculoskeletal:         General: No tenderness. Normal range of motion.      Cervical back: Normal range of motion and neck supple. No tenderness.     Skin:     General: Skin is warm and dry.     Neurological:      General: No focal deficit present.      Mental Status: She is alert and oriented to person, place, and time.     Psychiatric:         Mood and Affect: Mood normal.         Behavior: Behavior normal.       Testing   Reviewed prior notes on file     Impression    MDM    1) COMPLEXITY: MORE THAN 1 STABLE CHRONIC CONDITION ADDRESSED  2)DATA: TESTS INTERPRETED AND OR ORDERED, TOOK INDEPENDENT HISTORY OR RECORDS REVIEWED  3)RISK: MODERATE RISK DUE TO NATURE OF MEDICAL CONDITIONS/COMORBIDITY OR MEDICATIONS ORDERED OR SURGICAL OR PROCEDURE REFERRAL, .     Reviewed labs and Testing on file   Patient to follow diet low in cholesterol, fat, and sodium.    Patient is advised to increase Exercise.  Patient is recommended to lose weight.  Reviewed Meds and discussed common side effects  Continue as directed   Patient is strongly advised to be  compliant with recommendations.    Return to Clinic sooner if needed.  Patient denies further questions/concerns at this time    Assessment/Plan   Problem List Items Addressed This Visit           ICD-10-CM    Allergic rhinitis J30.9    Benign essential hypertension I10    Relevant Medications    lisinopril 10 mg tablet    Class 2 severe obesity due to excess calories with serious comorbidity and body mass index (BMI) of 37.0 to 37.9 in adult E66.812, E66.01, Z68.37     Other Visit Diagnoses         Codes      Laryngitis    -  Primary J04.0             FU as before   Set up screening PAP in next few months          [1]   Patient Active Problem List  Diagnosis    Allergic rhinitis    Benign essential hypertension    Bilateral carpal tunnel syndrome    Chronic low back pain with right-sided sciatica    GERD (gastroesophageal reflux disease)    Glucose intolerance (impaired glucose tolerance)    Hypercholesteremia    Insomnia    Lumbar arthropathy    Lumbar neuritis    Disc disease, degenerative, lumbar or lumbosacral    Right foot pain    Sacroiliitis    Sprain of left upper arm    Weight gain    Class 2 severe obesity due to excess calories with serious comorbidity and body mass index (BMI) of 37.0 to 37.9 in adult   [2]   Past Medical History:  Diagnosis Date    Encounter for gynecological examination (general) (routine) without abnormal findings     Pap test, as part of routine gynecological examination    Encounter for screening for malignant neoplasm of colon 12/08/2021    COLOGUARD NEG    Immunization not carried out because of patient refusal     Influenza vaccination declined    Personal history of other diseases of the musculoskeletal system and connective tissue     History of low back pain    Personal history of other diseases of the musculoskeletal system and connective tissue     History of muscle spasm    Personal history of other medical treatment     H/O bone density study    Personal history of other  medical treatment     H/O mammogram    Personal history of other specified conditions     History of palpitations   [3]   Past Surgical History:  Procedure Laterality Date     SECTION, LOW TRANSVERSE      OTHER SURGICAL HISTORY  2021    Epidural steroid injection    OTHER SURGICAL HISTORY  2021    Facet block   [4]   Family History  Problem Relation Name Age of Onset    Heart disease Mother      Arthritis Mother      Diabetes type II Mother      Hearing loss Father      Heart disease Father      Other (heart problem) Father      Diabetes type II Sister      Parkinsonism Brother     [5]   Social History  Tobacco Use    Smoking status: Never    Smokeless tobacco: Never   Vaping Use    Vaping status: Never Used   Substance Use Topics    Alcohol use: Yes     Comment: RARE OCCASION    Drug use: Never   [6]   Allergies  Allergen Reactions    Meloxicam Rash   [7]   Current Outpatient Medications   Medication Sig Dispense Refill    calcium carbonate-vit D3-min 600 mg calcium- 400 unit tablet Take 1 tablet by mouth once daily.      cyanocobalamin (Vitamin B-12) 1,000 mcg tablet Take 1 tablet (1,000 mcg) by mouth once daily. 90 tablet 3    lisinopril 10 mg tablet TAKE 1 TABLET BY MOUTH EVERY DAY 90 tablet 2    multivitamin tablet Take 1 tablet by mouth once daily.      montelukast (Singulair) 10 mg tablet Take 1 tablet (10 mg) by mouth early in the morning.. (Patient not taking: Reported on 2025)       No current facility-administered medications for this visit.

## 2025-09-30 ENCOUNTER — APPOINTMENT (OUTPATIENT)
Dept: PRIMARY CARE | Facility: CLINIC | Age: 60
End: 2025-09-30
Payer: COMMERCIAL

## 2025-11-25 ENCOUNTER — APPOINTMENT (OUTPATIENT)
Dept: PRIMARY CARE | Facility: CLINIC | Age: 60
End: 2025-11-25
Payer: COMMERCIAL